# Patient Record
Sex: MALE | Race: WHITE | NOT HISPANIC OR LATINO | Employment: OTHER | URBAN - METROPOLITAN AREA
[De-identification: names, ages, dates, MRNs, and addresses within clinical notes are randomized per-mention and may not be internally consistent; named-entity substitution may affect disease eponyms.]

---

## 2017-03-27 ENCOUNTER — APPOINTMENT (OUTPATIENT)
Dept: LAB | Facility: HOSPITAL | Age: 67
End: 2017-03-27
Attending: UROLOGY
Payer: MEDICARE

## 2017-03-27 ENCOUNTER — TRANSCRIBE ORDERS (OUTPATIENT)
Dept: ADMINISTRATIVE | Facility: HOSPITAL | Age: 67
End: 2017-03-27

## 2017-03-27 DIAGNOSIS — N40.1 BENIGN LOCALIZED HYPERPLASIA OF PROSTATE WITH URINARY RETENTION: Primary | ICD-10-CM

## 2017-03-27 DIAGNOSIS — R33.8 BENIGN LOCALIZED HYPERPLASIA OF PROSTATE WITH URINARY RETENTION: ICD-10-CM

## 2017-03-27 DIAGNOSIS — R33.8 BENIGN LOCALIZED HYPERPLASIA OF PROSTATE WITH URINARY RETENTION: Primary | ICD-10-CM

## 2017-03-27 DIAGNOSIS — N40.1 BENIGN LOCALIZED HYPERPLASIA OF PROSTATE WITH URINARY RETENTION: ICD-10-CM

## 2017-03-27 LAB — PSA SERPL-MCNC: 1 NG/ML (ref 0–4)

## 2017-03-27 PROCEDURE — 84153 ASSAY OF PSA TOTAL: CPT

## 2018-01-15 NOTE — RESULT NOTES
Message    Gastric polyps came back as benign fundic gland type  1    Colon polyp came back as tubular adenoma  1    Repeat colonoscopy in 3   years      LMOM for pt to call the office for results/reminder set  thanks CF1       1 Amended By: [place] Eun; Mar 08 2016 9:46 AM EST    Verified Results  (1) TISSUE EXAM [id number] 09:39AM [facility]     Test Name Result Flag Reference   LAB AP CASE REPORT (Report)     Surgical Pathology Report             Case: [id number]                   Authorizing Provider: Gabriele Granados MD     Collected:      02/29/2016 0939        Ordering Location:   [facility]    Received:      02/29/2016 [place] [address] [facility]                               Pathologist:      Kenyetta Gutierrez MD                              Specimens:  A) - Stomach, Bx Gastric body r/o hpylori                               B) - Polyp, Stomach/Small Intestine, Polypectomy gastric polyps                    C) - Polyp, Colorectal, Polypectomy descending   LAB AP FINAL DIAGNOSIS (Report)     A  Stomach, body, biopsy:        - Oxyntic mucosa with chronic inactive gastritis  - No Helicobacter pylori-like organisms are identified on the   immunohistochemical stain, performed with an appropriate positive control         - No intestinal metaplasia, dysplasia or malignancy is   identified  B  Stomach, polypectomy:        - Multiple fundic gland polyps        - No intestinal metaplasia, dysplasia or malignancy is   identified  C  Colon, descending, polypectomy:        - Tubular adenoma  - No high-grade dysplasia or malignancy is identified  LAB AP SURGICAL ADDITIONAL INFORMATION (Report)     These tests were developed and their performance characteristics   determined by [address] [place] [address] [facility] or [place]  They may not be cleared or approved by the [place]  Food and   Drug Administration   The FDA has determined that such clearance or approval is not necessary  These tests are used for clinical purposes  They should not be regarded as investigational or for research  This   laboratory has been approved by [place] [id number], designated as a high-complexity   laboratory and is qualified to perform these tests  LAB AP GROSS DESCRIPTION (Report)     A  The specimen is received in formalin, labeled with the patient's name   and hospital number, and is designated gastric body biopsy  The   specimen consists of 2 tan-pink soft tissue fragments measuring 0 2   centimeters and 0 3 centimeters in greatest dimension  Entirely submitted  One cassette  B  The specimen is received in formalin, labeled with the patient's name   and hospital number, and is designated gastric polyps  The specimen   consists of one tan-pink polypoid soft tissue fragment measuring zero   point 8 by 0 8 by 0 5 centimeters  The apparent margin of resection is   inked blue the polyp is bisected revealing tan-pink dense cut surfaces  Also submitted in container are multiple tan-pink soft tissue fragments   measuring in aggregate 1 2 by 1 1 by 0 3 centimeters  Entirely submitted  Two cassettes, with largest polyp in cassette A2  C  The specimen is received in formalin, labeled with the patient's name   and hospital number, and is designated descending colon polyp  The   specimen consists of one tan-pink soft tissue fragment measuring 0 3   centimeters in greatest dimension  Entirely submitted  One cassette  Note: The estimated total formalin fixation time based upon information   provided by the submitting clinician and the standard processing schedule   is 28 0 hours    MAS

## 2018-04-09 ENCOUNTER — APPOINTMENT (OUTPATIENT)
Dept: LAB | Facility: CLINIC | Age: 68
End: 2018-04-09
Payer: MEDICARE

## 2018-04-09 ENCOUNTER — TRANSCRIBE ORDERS (OUTPATIENT)
Dept: LAB | Facility: CLINIC | Age: 68
End: 2018-04-09

## 2018-04-09 DIAGNOSIS — N40.3 NODULAR PROSTATE WITH URINARY RETENTION: ICD-10-CM

## 2018-04-09 DIAGNOSIS — N13.8 NODULAR PROSTATE WITH URINARY OBSTRUCTION: Primary | ICD-10-CM

## 2018-04-09 DIAGNOSIS — R33.8 NODULAR PROSTATE WITH URINARY RETENTION: ICD-10-CM

## 2018-04-09 DIAGNOSIS — N40.3 NODULAR PROSTATE WITH URINARY OBSTRUCTION: Primary | ICD-10-CM

## 2018-04-09 PROCEDURE — G0103 PSA SCREENING: HCPCS

## 2018-04-10 LAB — PSA SERPL-MCNC: 1.2 NG/ML (ref 0–4)

## 2018-09-06 PROCEDURE — 88305 TISSUE EXAM BY PATHOLOGIST: CPT | Performed by: PATHOLOGY

## 2018-09-07 ENCOUNTER — TRANSCRIBE ORDERS (OUTPATIENT)
Dept: LAB | Facility: CLINIC | Age: 68
End: 2018-09-07

## 2018-09-07 ENCOUNTER — LAB REQUISITION (OUTPATIENT)
Dept: LAB | Facility: HOSPITAL | Age: 68
End: 2018-09-07
Payer: MEDICARE

## 2018-09-07 ENCOUNTER — APPOINTMENT (OUTPATIENT)
Dept: LAB | Facility: CLINIC | Age: 68
End: 2018-09-07
Payer: MEDICARE

## 2018-09-07 DIAGNOSIS — F41.9 ANXIETY HYPERVENTILATION: ICD-10-CM

## 2018-09-07 DIAGNOSIS — D48.9 NEOPLASM OF UNCERTAIN BEHAVIOR: ICD-10-CM

## 2018-09-07 DIAGNOSIS — F45.8 ANXIETY HYPERVENTILATION: ICD-10-CM

## 2018-09-07 DIAGNOSIS — I10 ESSENTIAL HYPERTENSION, MALIGNANT: Primary | ICD-10-CM

## 2018-09-07 LAB
25(OH)D3 SERPL-MCNC: 30.9 NG/ML (ref 30–100)
ALBUMIN SERPL BCP-MCNC: 3.9 G/DL (ref 3.5–5)
ALP SERPL-CCNC: 58 U/L (ref 46–116)
ALT SERPL W P-5'-P-CCNC: 31 U/L (ref 12–78)
ANION GAP SERPL CALCULATED.3IONS-SCNC: 6 MMOL/L (ref 4–13)
AST SERPL W P-5'-P-CCNC: 28 U/L (ref 5–45)
BASOPHILS # BLD AUTO: 0.04 THOUSANDS/ΜL (ref 0–0.1)
BASOPHILS NFR BLD AUTO: 1 % (ref 0–1)
BILIRUB SERPL-MCNC: 0.98 MG/DL (ref 0.2–1)
BUN SERPL-MCNC: 19 MG/DL (ref 5–25)
CALCIUM SERPL-MCNC: 8.8 MG/DL (ref 8.3–10.1)
CHLORIDE SERPL-SCNC: 104 MMOL/L (ref 100–108)
CHOLEST SERPL-MCNC: 143 MG/DL (ref 50–200)
CO2 SERPL-SCNC: 30 MMOL/L (ref 21–32)
CREAT SERPL-MCNC: 1.11 MG/DL (ref 0.6–1.3)
EOSINOPHIL # BLD AUTO: 0.09 THOUSAND/ΜL (ref 0–0.61)
EOSINOPHIL NFR BLD AUTO: 2 % (ref 0–6)
ERYTHROCYTE [DISTWIDTH] IN BLOOD BY AUTOMATED COUNT: 12.3 % (ref 11.6–15.1)
ERYTHROCYTE [SEDIMENTATION RATE] IN BLOOD: 2 MM/HOUR (ref 0–10)
GFR SERPL CREATININE-BSD FRML MDRD: 67 ML/MIN/1.73SQ M
GLUCOSE P FAST SERPL-MCNC: 88 MG/DL (ref 65–99)
HCT VFR BLD AUTO: 46.6 % (ref 36.5–49.3)
HDLC SERPL-MCNC: 69 MG/DL (ref 40–60)
HGB BLD-MCNC: 15.5 G/DL (ref 12–17)
IMM GRANULOCYTES # BLD AUTO: 0.02 THOUSAND/UL (ref 0–0.2)
IMM GRANULOCYTES NFR BLD AUTO: 0 % (ref 0–2)
LDLC SERPL CALC-MCNC: 63 MG/DL (ref 0–100)
LYMPHOCYTES # BLD AUTO: 1.04 THOUSANDS/ΜL (ref 0.6–4.47)
LYMPHOCYTES NFR BLD AUTO: 21 % (ref 14–44)
MAGNESIUM SERPL-MCNC: 2 MG/DL (ref 1.6–2.6)
MCH RBC QN AUTO: 29 PG (ref 26.8–34.3)
MCHC RBC AUTO-ENTMCNC: 33.3 G/DL (ref 31.4–37.4)
MCV RBC AUTO: 87 FL (ref 82–98)
MONOCYTES # BLD AUTO: 0.48 THOUSAND/ΜL (ref 0.17–1.22)
MONOCYTES NFR BLD AUTO: 10 % (ref 4–12)
NEUTROPHILS # BLD AUTO: 3.28 THOUSANDS/ΜL (ref 1.85–7.62)
NEUTS SEG NFR BLD AUTO: 66 % (ref 43–75)
NONHDLC SERPL-MCNC: 74 MG/DL
NRBC BLD AUTO-RTO: 0 /100 WBCS
PLATELET # BLD AUTO: 144 THOUSANDS/UL (ref 149–390)
PMV BLD AUTO: 10.3 FL (ref 8.9–12.7)
POTASSIUM SERPL-SCNC: 4.2 MMOL/L (ref 3.5–5.3)
PROT SERPL-MCNC: 6.9 G/DL (ref 6.4–8.2)
RBC # BLD AUTO: 5.35 MILLION/UL (ref 3.88–5.62)
SODIUM SERPL-SCNC: 140 MMOL/L (ref 136–145)
TRIGL SERPL-MCNC: 55 MG/DL
VIT B12 SERPL-MCNC: 865 PG/ML (ref 100–900)
WBC # BLD AUTO: 4.95 THOUSAND/UL (ref 4.31–10.16)

## 2018-09-07 PROCEDURE — 86618 LYME DISEASE ANTIBODY: CPT

## 2018-09-07 PROCEDURE — 86617 LYME DISEASE ANTIBODY: CPT

## 2018-09-07 PROCEDURE — 85652 RBC SED RATE AUTOMATED: CPT

## 2018-09-07 PROCEDURE — 82747 ASSAY OF FOLIC ACID RBC: CPT

## 2018-09-07 PROCEDURE — 85025 COMPLETE CBC W/AUTO DIFF WBC: CPT

## 2018-09-07 PROCEDURE — 82607 VITAMIN B-12: CPT

## 2018-09-07 PROCEDURE — 80061 LIPID PANEL: CPT

## 2018-09-07 PROCEDURE — 80053 COMPREHEN METABOLIC PANEL: CPT

## 2018-09-07 PROCEDURE — 83735 ASSAY OF MAGNESIUM: CPT

## 2018-09-07 PROCEDURE — 82306 VITAMIN D 25 HYDROXY: CPT

## 2018-09-07 PROCEDURE — 36415 COLL VENOUS BLD VENIPUNCTURE: CPT

## 2018-09-09 LAB
B BURGDOR IGG SER IA-ACNC: 1.75
B BURGDOR IGM SER IA-ACNC: 0.47
FOLATE BLD-MCNC: 595.1 NG/ML
FOLATE RBC-MCNC: NORMAL NG/ML
HCT VFR BLD AUTO: NORMAL %
MORPHOLOGY BLD-IMP: NORMAL

## 2018-09-12 LAB

## 2019-01-16 ENCOUNTER — TRANSCRIBE ORDERS (OUTPATIENT)
Dept: LAB | Facility: CLINIC | Age: 69
End: 2019-01-16

## 2019-01-16 ENCOUNTER — APPOINTMENT (OUTPATIENT)
Dept: LAB | Facility: CLINIC | Age: 69
End: 2019-01-16
Payer: MEDICARE

## 2019-01-16 DIAGNOSIS — I10 ESSENTIAL HYPERTENSION, MALIGNANT: Primary | ICD-10-CM

## 2019-01-16 DIAGNOSIS — E78.5 HYPERLIPIDEMIA, UNSPECIFIED HYPERLIPIDEMIA TYPE: ICD-10-CM

## 2019-01-16 LAB
ANION GAP SERPL CALCULATED.3IONS-SCNC: 4 MMOL/L (ref 4–13)
BASOPHILS # BLD AUTO: 0.04 THOUSANDS/ΜL (ref 0–0.1)
BASOPHILS NFR BLD AUTO: 1 % (ref 0–1)
BUN SERPL-MCNC: 29 MG/DL (ref 5–25)
CALCIUM SERPL-MCNC: 8.6 MG/DL (ref 8.3–10.1)
CHLORIDE SERPL-SCNC: 109 MMOL/L (ref 100–108)
CHOLEST SERPL-MCNC: 134 MG/DL (ref 50–200)
CO2 SERPL-SCNC: 27 MMOL/L (ref 21–32)
CREAT SERPL-MCNC: 1.34 MG/DL (ref 0.6–1.3)
EOSINOPHIL # BLD AUTO: 0.1 THOUSAND/ΜL (ref 0–0.61)
EOSINOPHIL NFR BLD AUTO: 2 % (ref 0–6)
ERYTHROCYTE [DISTWIDTH] IN BLOOD BY AUTOMATED COUNT: 12.3 % (ref 11.6–15.1)
GFR SERPL CREATININE-BSD FRML MDRD: 53 ML/MIN/1.73SQ M
GLUCOSE P FAST SERPL-MCNC: 91 MG/DL (ref 65–99)
HCT VFR BLD AUTO: 44.1 % (ref 36.5–49.3)
HDLC SERPL-MCNC: 59 MG/DL (ref 40–60)
HGB BLD-MCNC: 14.6 G/DL (ref 12–17)
IMM GRANULOCYTES # BLD AUTO: 0.01 THOUSAND/UL (ref 0–0.2)
IMM GRANULOCYTES NFR BLD AUTO: 0 % (ref 0–2)
LDLC SERPL CALC-MCNC: 64 MG/DL (ref 0–100)
LYMPHOCYTES # BLD AUTO: 1.36 THOUSANDS/ΜL (ref 0.6–4.47)
LYMPHOCYTES NFR BLD AUTO: 27 % (ref 14–44)
MAGNESIUM SERPL-MCNC: 2.1 MG/DL (ref 1.6–2.6)
MCH RBC QN AUTO: 28.5 PG (ref 26.8–34.3)
MCHC RBC AUTO-ENTMCNC: 33.1 G/DL (ref 31.4–37.4)
MCV RBC AUTO: 86 FL (ref 82–98)
MONOCYTES # BLD AUTO: 0.56 THOUSAND/ΜL (ref 0.17–1.22)
MONOCYTES NFR BLD AUTO: 11 % (ref 4–12)
NEUTROPHILS # BLD AUTO: 3.03 THOUSANDS/ΜL (ref 1.85–7.62)
NEUTS SEG NFR BLD AUTO: 59 % (ref 43–75)
NONHDLC SERPL-MCNC: 75 MG/DL
NRBC BLD AUTO-RTO: 0 /100 WBCS
PLATELET # BLD AUTO: 154 THOUSANDS/UL (ref 149–390)
PMV BLD AUTO: 10 FL (ref 8.9–12.7)
POTASSIUM SERPL-SCNC: 4 MMOL/L (ref 3.5–5.3)
RBC # BLD AUTO: 5.12 MILLION/UL (ref 3.88–5.62)
SODIUM SERPL-SCNC: 140 MMOL/L (ref 136–145)
TRIGL SERPL-MCNC: 54 MG/DL
WBC # BLD AUTO: 5.1 THOUSAND/UL (ref 4.31–10.16)

## 2019-01-16 PROCEDURE — 83735 ASSAY OF MAGNESIUM: CPT

## 2019-01-16 PROCEDURE — 85025 COMPLETE CBC W/AUTO DIFF WBC: CPT

## 2019-01-16 PROCEDURE — 80048 BASIC METABOLIC PNL TOTAL CA: CPT

## 2019-01-16 PROCEDURE — 80061 LIPID PANEL: CPT

## 2019-01-16 PROCEDURE — 36415 COLL VENOUS BLD VENIPUNCTURE: CPT

## 2019-08-06 ENCOUNTER — APPOINTMENT (OUTPATIENT)
Dept: LAB | Facility: CLINIC | Age: 69
End: 2019-08-06
Payer: MEDICARE

## 2019-08-06 ENCOUNTER — TRANSCRIBE ORDERS (OUTPATIENT)
Dept: LAB | Facility: CLINIC | Age: 69
End: 2019-08-06

## 2019-08-06 DIAGNOSIS — R00.2 PALPITATIONS: ICD-10-CM

## 2019-08-06 DIAGNOSIS — E78.9 DISORDER OF LIPOPROTEIN AND LIPID METABOLISM: ICD-10-CM

## 2019-08-06 DIAGNOSIS — I10 ESSENTIAL HYPERTENSION, MALIGNANT: ICD-10-CM

## 2019-08-06 DIAGNOSIS — R60.9 EDEMA, UNSPECIFIED TYPE: Primary | ICD-10-CM

## 2019-08-06 LAB
ALT SERPL W P-5'-P-CCNC: 34 U/L (ref 12–78)
ANION GAP SERPL CALCULATED.3IONS-SCNC: 4 MMOL/L (ref 4–13)
AST SERPL W P-5'-P-CCNC: 27 U/L (ref 5–45)
BUN SERPL-MCNC: 33 MG/DL (ref 5–25)
CALCIUM SERPL-MCNC: 9.3 MG/DL (ref 8.3–10.1)
CHLORIDE SERPL-SCNC: 111 MMOL/L (ref 100–108)
CHOLEST SERPL-MCNC: 148 MG/DL (ref 50–200)
CO2 SERPL-SCNC: 31 MMOL/L (ref 21–32)
CREAT SERPL-MCNC: 1.19 MG/DL (ref 0.6–1.3)
ERYTHROCYTE [DISTWIDTH] IN BLOOD BY AUTOMATED COUNT: 12.6 % (ref 11.6–15.1)
GFR SERPL CREATININE-BSD FRML MDRD: 61 ML/MIN/1.73SQ M
GLUCOSE P FAST SERPL-MCNC: 102 MG/DL (ref 65–99)
HCT VFR BLD AUTO: 43.4 % (ref 36.5–49.3)
HDLC SERPL-MCNC: 63 MG/DL (ref 40–60)
HGB BLD-MCNC: 13.9 G/DL (ref 12–17)
LDLC SERPL CALC-MCNC: 78 MG/DL (ref 0–100)
LDLC SERPL DIRECT ASSAY-MCNC: 76 MG/DL (ref 0–100)
MCH RBC QN AUTO: 28.4 PG (ref 26.8–34.3)
MCHC RBC AUTO-ENTMCNC: 32 G/DL (ref 31.4–37.4)
MCV RBC AUTO: 89 FL (ref 82–98)
NONHDLC SERPL-MCNC: 85 MG/DL
PLATELET # BLD AUTO: 153 THOUSANDS/UL (ref 149–390)
PMV BLD AUTO: 10.5 FL (ref 8.9–12.7)
POTASSIUM SERPL-SCNC: 4.6 MMOL/L (ref 3.5–5.3)
RBC # BLD AUTO: 4.89 MILLION/UL (ref 3.88–5.62)
SODIUM SERPL-SCNC: 146 MMOL/L (ref 136–145)
TRIGL SERPL-MCNC: 36 MG/DL
TSH SERPL DL<=0.05 MIU/L-ACNC: 1.9 UIU/ML (ref 0.36–3.74)
WBC # BLD AUTO: 4.29 THOUSAND/UL (ref 4.31–10.16)

## 2019-08-06 PROCEDURE — 84450 TRANSFERASE (AST) (SGOT): CPT

## 2019-08-06 PROCEDURE — 80048 BASIC METABOLIC PNL TOTAL CA: CPT

## 2019-08-06 PROCEDURE — 84443 ASSAY THYROID STIM HORMONE: CPT

## 2019-08-06 PROCEDURE — 80061 LIPID PANEL: CPT

## 2019-08-06 PROCEDURE — 83721 ASSAY OF BLOOD LIPOPROTEIN: CPT

## 2019-08-06 PROCEDURE — 84460 ALANINE AMINO (ALT) (SGPT): CPT

## 2019-08-06 PROCEDURE — 36415 COLL VENOUS BLD VENIPUNCTURE: CPT

## 2019-08-06 PROCEDURE — 85027 COMPLETE CBC AUTOMATED: CPT

## 2019-09-25 ENCOUNTER — TRANSCRIBE ORDERS (OUTPATIENT)
Dept: LAB | Facility: CLINIC | Age: 69
End: 2019-09-25

## 2019-11-18 ENCOUNTER — HOSPITAL ENCOUNTER (OUTPATIENT)
Dept: RADIOLOGY | Facility: HOSPITAL | Age: 69
Discharge: HOME | End: 2019-11-18
Attending: UROLOGY
Payer: MEDICARE

## 2019-11-18 ENCOUNTER — TRANSCRIBE ORDERS (OUTPATIENT)
Dept: ADMINISTRATIVE | Facility: HOSPITAL | Age: 69
End: 2019-11-18

## 2019-11-18 DIAGNOSIS — Z87.442 PERSONAL HISTORY OF URINARY CALCULI: Primary | ICD-10-CM

## 2019-11-18 PROCEDURE — 74018 RADEX ABDOMEN 1 VIEW: CPT

## 2021-02-26 ENCOUNTER — IMMUNIZATIONS (OUTPATIENT)
Dept: FAMILY MEDICINE CLINIC | Facility: HOSPITAL | Age: 71
End: 2021-02-26

## 2021-02-26 DIAGNOSIS — Z23 ENCOUNTER FOR IMMUNIZATION: Primary | ICD-10-CM

## 2021-02-26 PROCEDURE — 91301 SARS-COV-2 / COVID-19 MRNA VACCINE (MODERNA) 100 MCG: CPT

## 2021-02-26 PROCEDURE — 0011A SARS-COV-2 / COVID-19 MRNA VACCINE (MODERNA) 100 MCG: CPT

## 2021-03-26 ENCOUNTER — IMMUNIZATIONS (OUTPATIENT)
Dept: FAMILY MEDICINE CLINIC | Facility: HOSPITAL | Age: 71
End: 2021-03-26

## 2021-03-26 DIAGNOSIS — Z23 ENCOUNTER FOR IMMUNIZATION: Primary | ICD-10-CM

## 2021-03-26 PROCEDURE — 91301 SARS-COV-2 / COVID-19 MRNA VACCINE (MODERNA) 100 MCG: CPT

## 2021-03-26 PROCEDURE — 0012A SARS-COV-2 / COVID-19 MRNA VACCINE (MODERNA) 100 MCG: CPT

## 2023-02-09 ENCOUNTER — OFFICE VISIT (OUTPATIENT)
Dept: PODIATRY | Facility: CLINIC | Age: 73
End: 2023-02-09

## 2023-02-09 VITALS — BODY MASS INDEX: 29.12 KG/M2 | WEIGHT: 215 LBS | RESPIRATION RATE: 17 BRPM | HEIGHT: 72 IN

## 2023-02-09 DIAGNOSIS — B35.1 ONYCHOMYCOSIS: ICD-10-CM

## 2023-02-09 DIAGNOSIS — L03.031 PARONYCHIA OF TOENAIL OF RIGHT FOOT: ICD-10-CM

## 2023-02-09 DIAGNOSIS — M79.671 PAIN IN BOTH FEET: Primary | ICD-10-CM

## 2023-02-09 DIAGNOSIS — L60.0 INGROWN TOENAIL: ICD-10-CM

## 2023-02-09 DIAGNOSIS — M79.672 PAIN IN BOTH FEET: Primary | ICD-10-CM

## 2023-02-09 NOTE — PROGRESS NOTES
Assessment/Plan: Pain upon ambulation  Mild peripheral artery disease  Paronychia right hallux  Ingrown toenail  Plan  Foot exam performed  Patient educated on condition  Patient had all nails debrided today without pain or complication  He might need right nail hallux matrixectomy  Patient advised on aftercare  Diagnoses and all orders for this visit:    Pain in both feet    Onychomycosis    Ingrown toenail    Paronychia of toenail of right foot          Subjective: Patient has pain  He has pain in his toes ambulation  Mostly the right big toe  No Known Allergies      Current Outpatient Medications:   •  aspirin 81 MG tablet, Take 81 mg by mouth daily  , Disp: , Rfl:   •  atorvastatin (LIPITOR) 10 mg tablet, Take 10 mg by mouth daily  , Disp: , Rfl:   •  folic acid (FOLVITE) 1 mg tablet, Take 1 mg by mouth daily  , Disp: , Rfl:   •  pantoprazole (PROTONIX) 40 mg tablet, Take 40 mg by mouth daily  , Disp: , Rfl:     There is no problem list on file for this patient  Patient ID: Farzaneh Espinoza is a 74 y o  male  HPI    The following portions of the patient's history were reviewed and updated as appropriate:     family history is not on file  reports that he has never smoked  He does not have any smokeless tobacco history on file  He reports that he does not use drugs  No history on file for alcohol use  Vitals:    02/09/23 1310   Resp: 17       Review of Systems      Objective:  Patient's shoes and socks removed     Foot Exam    General  General Appearance: appears stated age and healthy   Orientation: alert and oriented to person, place, and time   Affect: appropriate   Gait: antalgic       Right Foot/Ankle     Inspection and Palpation  Tenderness: metatarsals   Swelling: dorsum   Arch: pes planus  Skin Exam: dry skin;     Neurovascular  Dorsalis pedis: 1+  Posterior tibial: 3+      Left Foot/Ankle      Inspection and Palpation  Tenderness: metatarsals   Swelling: dorsum Arch: pes planus  Skin Exam: dry skin;     Neurovascular  Dorsalis pedis: 1+  Posterior tibial: 3+        Physical Exam  Vitals and nursing note reviewed  Constitutional:       Appearance: Normal appearance  Cardiovascular:      Rate and Rhythm: Normal rate and regular rhythm  Pulses:           Dorsalis pedis pulses are 1+ on the right side and 1+ on the left side  Posterior tibial pulses are 3+ on the right side and 3+ on the left side  Feet:      Right foot:      Skin integrity: Dry skin present  Left foot:      Skin integrity: Dry skin present  Skin:     Capillary Refill: Capillary refill takes less than 2 seconds  Comments: Nails are dystrophic  Right hallux demonstrates incurvated nail  He is ingrown in the fibular aspect  Positive paronychia    Negative pus   Neurological:      Mental Status: He is alert  Psychiatric:         Mood and Affect: Mood normal          Behavior: Behavior normal          Thought Content:  Thought content normal          Judgment: Judgment normal

## 2023-04-06 ENCOUNTER — OFFICE VISIT (OUTPATIENT)
Dept: PODIATRY | Facility: CLINIC | Age: 73
End: 2023-04-06

## 2023-04-06 VITALS — HEIGHT: 72 IN | RESPIRATION RATE: 17 BRPM | WEIGHT: 215 LBS | BODY MASS INDEX: 29.12 KG/M2

## 2023-04-06 DIAGNOSIS — M79.671 PAIN IN BOTH FEET: ICD-10-CM

## 2023-04-06 DIAGNOSIS — M21.961 ACQUIRED DEFORMITY OF BOTH FEET: Primary | ICD-10-CM

## 2023-04-06 DIAGNOSIS — B35.1 ONYCHOMYCOSIS: ICD-10-CM

## 2023-04-06 DIAGNOSIS — M79.672 PAIN IN BOTH FEET: ICD-10-CM

## 2023-04-06 DIAGNOSIS — L03.031 PARONYCHIA OF TOENAIL OF RIGHT FOOT: ICD-10-CM

## 2023-04-06 DIAGNOSIS — L60.0 INGROWN TOENAIL: ICD-10-CM

## 2023-04-06 DIAGNOSIS — M21.962 ACQUIRED DEFORMITY OF BOTH FEET: Primary | ICD-10-CM

## 2023-04-06 NOTE — PROGRESS NOTES
Assessment/Plan: Pain upon ambulation  Mild peripheral artery disease  Paronychia right hallux  Ingrown toenail  Require deformity foot bilateral   Functional hallux limitus      Plan  Foot exam performed  Patient educated on condition  Patient had all nails debrided today without pain or complication  He might need right nail hallux matrixectomy  Patient advised on aftercare  Patient will use topical antifungal as directed  He is advised on proper shoe sizing             Diagnoses and all orders for this visit:     Pain in both feet     Onychomycosis     Ingrown toenail     Paronychia of toenail of right foot    Acquired deformity foot bilateral                Subjective: Patient has pain  He has pain in his toes ambulation  Mostly the right big toe      No Known Allergies        Current Outpatient Medications:   •  aspirin 81 MG tablet, Take 81 mg by mouth daily  , Disp: , Rfl:   •  atorvastatin (LIPITOR) 10 mg tablet, Take 10 mg by mouth daily  , Disp: , Rfl:   •  folic acid (FOLVITE) 1 mg tablet, Take 1 mg by mouth daily  , Disp: , Rfl:   •  pantoprazole (PROTONIX) 40 mg tablet, Take 40 mg by mouth daily  , Disp: , Rfl:      There is no problem list on file for this patient             Patient ID: Yvonne Andrew is a 74 y o  male      HPI     The following portions of the patient's history were reviewed and updated as appropriate:      family history is not on file        reports that he has never smoked  He does not have any smokeless tobacco history on file  He reports that he does not use drugs  No history on file for alcohol use       Objective:  Patient's shoes and socks removed     Foot Exam     General  General Appearance: appears stated age and healthy   Orientation: alert and oriented to person, place, and time   Affect: appropriate   Gait: antalgic         Right Foot/Ankle      Inspection and Palpation  Tenderness: metatarsals   Swelling: dorsum   Arch: pes planus  Skin Exam: dry skin;    Neurovascular  Dorsalis pedis: 1+  Posterior tibial: 3+        Left Foot/Ankle       Inspection and Palpation  Tenderness: metatarsals   Swelling: dorsum   Arch: pes planus  Skin Exam: dry skin;      Neurovascular  Dorsalis pedis: 1+  Posterior tibial: 3+           Physical Exam  Vitals and nursing note reviewed  Constitutional:       Appearance: Normal appearance  Cardiovascular:      Rate and Rhythm: Normal rate and regular rhythm  Pulses:           Dorsalis pedis pulses are 1+ on the right side and 1+ on the left side  Posterior tibial pulses are 3+ on the right side and 3+ on the left side  Feet:      Right foot:      Skin integrity: Dry skin present  Left foot:      Skin integrity: Dry skin present  Skin:     Capillary Refill: Capillary refill takes less than 2 seconds  Comments: Nails are dystrophic  Right hallux demonstrates incurvated nail  He is ingrown in the fibular aspect  Positive paronychia    Negative pus   Neurological:      Mental Status: He is alert  Psychiatric:         Mood and Affect: Mood normal          Behavior: Behavior normal          Thought Content:  Thought content normal          Judgment: Judgment normal

## 2023-06-15 ENCOUNTER — OFFICE VISIT (OUTPATIENT)
Dept: PODIATRY | Facility: CLINIC | Age: 73
End: 2023-06-15
Payer: MEDICARE

## 2023-06-15 VITALS — RESPIRATION RATE: 17 BRPM | HEIGHT: 72 IN | WEIGHT: 215 LBS | BODY MASS INDEX: 29.12 KG/M2

## 2023-06-15 DIAGNOSIS — B35.1 ONYCHOMYCOSIS: Primary | ICD-10-CM

## 2023-06-15 DIAGNOSIS — L60.0 INGROWN TOENAIL: ICD-10-CM

## 2023-06-15 DIAGNOSIS — L03.031 PARONYCHIA OF TOENAIL OF RIGHT FOOT: ICD-10-CM

## 2023-06-15 DIAGNOSIS — M21.962 ACQUIRED DEFORMITY OF BOTH FEET: ICD-10-CM

## 2023-06-15 DIAGNOSIS — M79.672 PAIN IN BOTH FEET: ICD-10-CM

## 2023-06-15 DIAGNOSIS — M21.961 ACQUIRED DEFORMITY OF BOTH FEET: ICD-10-CM

## 2023-06-15 DIAGNOSIS — M79.671 PAIN IN BOTH FEET: ICD-10-CM

## 2023-06-15 PROCEDURE — 99213 OFFICE O/P EST LOW 20 MIN: CPT | Performed by: PODIATRIST

## 2023-06-15 RX ORDER — TERBINAFINE HYDROCHLORIDE 250 MG/1
TABLET ORAL
Qty: 15 TABLET | Refills: 0 | Status: SHIPPED | OUTPATIENT
Start: 2023-06-15 | End: 2023-07-15

## 2023-06-15 NOTE — PROGRESS NOTES
Assessment/Plan: Pain upon ambulation   Mild peripheral artery disease   Paronychia right hallux   Ingrown toenail     Require deformity foot bilateral   Functional hallux limitus      Plan   Foot exam performed   Patient educated on condition   Patient had all nails debrided today without pain or complication   He might need right nail hallux matrixectomy   Patient advised on aftercare  Patient will use topical antifungal as directed  He is advised on proper shoe sizing  In addition we will do an empiric course of Lamisil  Assessment/Plan:         There are no diagnoses linked to this encounter  Subjective:             No Known Allergies      Current Outpatient Medications:   •  aspirin 81 MG tablet, Take 81 mg by mouth daily  , Disp: , Rfl:   •  atorvastatin (LIPITOR) 10 mg tablet, Take 10 mg by mouth daily  , Disp: , Rfl:   •  folic acid (FOLVITE) 1 mg tablet, Take 1 mg by mouth daily  , Disp: , Rfl:   •  pantoprazole (PROTONIX) 40 mg tablet, Take 40 mg by mouth daily  , Disp: , Rfl:     There is no problem list on file for this patient  Patient ID: Sage Meyer is a 74 y o  male  HPI    The following portions of the patient's history were reviewed and updated as appropriate: He  has a past medical history of GERD (gastroesophageal reflux disease), History of TIA (transient ischemic attack), and Hypertension  Review of Systems      Objective:  Patient's shoes and socks removed  Foot ExamPhysical ExamDiabetic Foot Exam      Is anxious to have his chronic ingrown toenail of the right big toe fix    We will schedule nail matrixectomy             Diagnoses and all orders for this visit:     Pain in both feet     Onychomycosis     Ingrown toenail     Paronychia of toenail of right foot     Acquired deformity foot bilateral                  Subjective: Patient has pain   He has pain in his toes ambulation   Mostly the right big toe      No Known Allergies        Current Outpatient Medications:   •  aspirin 81 MG tablet, Take 81 mg by mouth daily  , Disp: , Rfl:   •  atorvastatin (LIPITOR) 10 mg tablet, Take 10 mg by mouth daily  , Disp: , Rfl:   •  folic acid (FOLVITE) 1 mg tablet, Take 1 mg by mouth daily  , Disp: , Rfl:   •  pantoprazole (PROTONIX) 40 mg tablet, Take 40 mg by mouth daily  , Disp: , Rfl:      There is no problem list on file for this patient             Patient ID: Fredy Solis is a 74 y  o  male      HPI     The following portions of the patient's history were reviewed and updated as appropriate:      family history is not on file        reports that he has never smoked  He does not have any smokeless tobacco history on file  He reports that he does not use drugs  No history on file for alcohol use       Objective:  Patient's shoes and socks removed    Foot Exam     General  General Appearance: appears stated age and healthy   Orientation: alert and oriented to person, place, and time   Affect: appropriate   Gait: antalgic         Right Foot/Ankle      Inspection and Palpation  Tenderness: metatarsals   Swelling: dorsum   Arch: pes planus  Skin Exam: dry skin;      Neurovascular  Dorsalis pedis: 1+  Posterior tibial: 3+        Left Foot/Ankle       Inspection and Palpation  Tenderness: metatarsals   Swelling: dorsum   Arch: pes planus  Skin Exam: dry skin;      Neurovascular  Dorsalis pedis: 1+  Posterior tibial: 3+           Physical Exam  Vitals and nursing note reviewed  Constitutional:       Appearance: Normal appearance  Cardiovascular:      Rate and Rhythm: Normal rate and regular rhythm       Pulses:           Dorsalis pedis pulses are 1+ on the right side and 1+ on the left side         Posterior tibial pulses are 3+ on the right side and 3+ on the left side  Feet:      Right foot:      Skin integrity: Dry skin present       Left foot:      Skin integrity: Dry skin present     Skin:     Capillary Refill: Capillary refill takes less than 2 seconds       Comments: Nails are dystrophic   Right hallux demonstrates incurvated nail   He is ingrown in the fibular aspect   Positive paronychia    Negative pus   Neurological:      Mental Status: He is alert  Psychiatric:         Mood and Affect: Mood normal          BehaviorFederico Kilgore         Thought Content:  Thought content normal          Judgment: Judgment normal

## 2023-07-24 ENCOUNTER — PROCEDURE VISIT (OUTPATIENT)
Dept: PODIATRY | Facility: CLINIC | Age: 73
End: 2023-07-24
Payer: MEDICARE

## 2023-07-24 VITALS — WEIGHT: 215 LBS | HEIGHT: 72 IN | RESPIRATION RATE: 17 BRPM | BODY MASS INDEX: 29.12 KG/M2

## 2023-07-24 DIAGNOSIS — L03.031 PARONYCHIA OF TOENAIL OF RIGHT FOOT: ICD-10-CM

## 2023-07-24 DIAGNOSIS — M79.672 PAIN IN BOTH FEET: ICD-10-CM

## 2023-07-24 DIAGNOSIS — L60.0 INGROWN TOENAIL: Primary | ICD-10-CM

## 2023-07-24 DIAGNOSIS — M79.671 PAIN IN BOTH FEET: ICD-10-CM

## 2023-07-24 DIAGNOSIS — B35.1 ONYCHOMYCOSIS: ICD-10-CM

## 2023-07-24 DIAGNOSIS — M21.961 ACQUIRED DEFORMITY OF BOTH FEET: ICD-10-CM

## 2023-07-24 DIAGNOSIS — M21.962 ACQUIRED DEFORMITY OF BOTH FEET: ICD-10-CM

## 2023-07-24 PROCEDURE — 11750 EXCISION NAIL&NAIL MATRIX: CPT | Performed by: PODIATRIST

## 2023-07-24 PROCEDURE — 99212 OFFICE O/P EST SF 10 MIN: CPT | Performed by: PODIATRIST

## 2023-07-24 RX ORDER — TERBINAFINE HYDROCHLORIDE 250 MG/1
TABLET ORAL
Qty: 15 TABLET | Refills: 0 | Status: SHIPPED | OUTPATIENT
Start: 2023-07-24 | End: 2023-08-23

## 2023-07-24 NOTE — PROGRESS NOTES
Nail removal    Date/Time: 7/24/2023 4:30 PM    Performed by: Enid Mckeon DPM  Authorized by: Enid Mckeon DPM    Patient location:  [facility] Protocol:  Consent: Verbal consent obtained. Written consent obtained. Risks and benefits: risks, benefits and alternatives were discussed  Consent given by: patient  Timeout called at: 7/24/2023 4:34 PM.  Patient understanding: patient states understanding of the procedure being performed      Location:     Foot:  R big toe  Pre-procedure details:     Skin preparation:  Betadine    Preparation: Patient was prepped and draped in the usual sterile fashion    Anesthesia (see MAR for exact dosages): Anesthesia method: Hallux nerve block achieved with 6 cc 2% lidocaine plain. Nail Removal:     Nail removed:  Partial    Nail side:  Lateral    Nail bed sutured: no      Removed nail replaced and anchored: no    Trephination:     Subungual hematoma drained: no    Ingrown nail:     Wedge excision of skin: no      Nail matrix removed or ablated:  Partial (Nail matrixectomy performed application of 3, 30 seconds application of phenol)  Post-procedure details:     Dressing:  Non-adhesive packing strip, 4x4 sterile gauze, antibiotic ointment and gauze roll  Comments:      Patient advised on nail matrixectomy. Patient given preoperative and postoperative instruction. Consent form signed. In addition, all nails debrided without pain or complication. Patient remain on oral terbinafine as directed. Return for follow-up.        Foot Exam      Foot Exam     General  General Appearance: appears stated age and healthy   Orientation: alert and oriented to person, place, and time   Affect: appropriate   Gait: antalgic         Right Foot/Ankle      Inspection and Palpation  Tenderness: metatarsals   Swelling: dorsum   Arch: pes planus  Skin Exam: dry skin;      Neurovascular  Dorsalis pedis: 1+  Posterior tibial: 3+        Left Foot/Ankle       Inspection and Palpation  Tenderness: metatarsals   Swelling: dorsum   Arch: pes planus  Skin Exam: dry skin;      Neurovascular  Dorsalis pedis: 1+  Posterior tibial: 3+           Physical Exam  Vitals and nursing note reviewed. Constitutional:       Appearance: Normal appearance. Cardiovascular:      Rate and Rhythm: Normal rate and regular rhythm.      Pulses:           Dorsalis pedis pulses are 1+ on the right side and 1+ on the left side.        Posterior tibial pulses are 3+ on the right side and 3+ on the left side. Feet:      Right foot:      Skin integrity: Dry skin present.      Left foot:      Skin integrity: Dry skin present. Skin:     Capillary Refill: Capillary refill takes less than 2 seconds.      Comments: Nails are dystrophic.  Right hallux demonstrates incurvated nail.  He is ingrown in the fibular aspect.  Positive paronychia. Nails 234 of the left foot demonstrate distal mycosis. Negative pus   Neurological:      Mental Status: He is alert. Psychiatric:         Mood and Affect: Mood normal.         BehaviorTerre Filippo         Thought Content:  Thought content normal.         Judgment: Judgment normal.

## 2023-08-29 ENCOUNTER — OFFICE VISIT (OUTPATIENT)
Dept: PODIATRY | Facility: CLINIC | Age: 73
End: 2023-08-29
Payer: MEDICARE

## 2023-08-29 VITALS — RESPIRATION RATE: 17 BRPM | BODY MASS INDEX: 29.12 KG/M2 | HEIGHT: 72 IN | WEIGHT: 215 LBS

## 2023-08-29 DIAGNOSIS — B35.1 ONYCHOMYCOSIS: ICD-10-CM

## 2023-08-29 DIAGNOSIS — S91.109A OPEN WOUND OF TOE, INITIAL ENCOUNTER: ICD-10-CM

## 2023-08-29 DIAGNOSIS — M79.671 PAIN IN BOTH FEET: Primary | ICD-10-CM

## 2023-08-29 DIAGNOSIS — M21.962 ACQUIRED DEFORMITY OF BOTH FEET: ICD-10-CM

## 2023-08-29 DIAGNOSIS — M79.672 PAIN IN BOTH FEET: Primary | ICD-10-CM

## 2023-08-29 DIAGNOSIS — M21.961 ACQUIRED DEFORMITY OF BOTH FEET: ICD-10-CM

## 2023-08-29 PROCEDURE — 99213 OFFICE O/P EST LOW 20 MIN: CPT | Performed by: PODIATRIST

## 2023-08-29 RX ORDER — TERBINAFINE HYDROCHLORIDE 250 MG/1
TABLET ORAL
Qty: 15 TABLET | Refills: 0 | Status: SHIPPED | OUTPATIENT
Start: 2023-08-29 | End: 2023-09-28

## 2023-11-07 ENCOUNTER — OFFICE VISIT (OUTPATIENT)
Age: 73
End: 2023-11-07
Payer: MEDICARE

## 2023-11-07 VITALS
WEIGHT: 215 LBS | HEART RATE: 57 BPM | SYSTOLIC BLOOD PRESSURE: 107 MMHG | HEIGHT: 72 IN | RESPIRATION RATE: 17 BRPM | BODY MASS INDEX: 29.12 KG/M2 | DIASTOLIC BLOOD PRESSURE: 70 MMHG

## 2023-11-07 DIAGNOSIS — M79.672 PAIN IN BOTH FEET: Primary | ICD-10-CM

## 2023-11-07 DIAGNOSIS — M79.671 PAIN IN BOTH FEET: Primary | ICD-10-CM

## 2023-11-07 DIAGNOSIS — L60.0 INGROWN TOENAIL: ICD-10-CM

## 2023-11-07 DIAGNOSIS — M21.962 ACQUIRED DEFORMITY OF BOTH FEET: ICD-10-CM

## 2023-11-07 DIAGNOSIS — B35.1 ONYCHOMYCOSIS: ICD-10-CM

## 2023-11-07 DIAGNOSIS — M21.961 ACQUIRED DEFORMITY OF BOTH FEET: ICD-10-CM

## 2023-11-07 PROCEDURE — 99213 OFFICE O/P EST LOW 20 MIN: CPT | Performed by: PODIATRIST

## 2023-11-07 NOTE — PROGRESS NOTES
Assessment/Plan: Open wound of toe. Resolved paronychia. Acquired deformity foot bilateral.  Mycosis of nail, resolving. Pain upon ambulation. Plan. Chart reviewed. Lab work reviewed. Patient advised on condition. Patient may have had reaction to Lamisil. He had elevation of inflammatory markers. At this time we recommend topical antifungal.  All nails debrided without pain or complication. Patient will watch for signs of infection. Aftercare instruction given. Return for follow-up            Diagnoses and all orders for this visit:     Pain in both feet     Acquired deformity of both feet     Onychomycosis              Subjective: Patient is doing well. Took medication as prescribed. He believes it elevated his inflammatory markers     No Known Allergies        Current Outpatient Medications:     terbinafine (LamISIL) 250 mg tablet, 1 tab p.o. every other day., Disp: 15 tablet, Rfl: 0    aspirin 81 MG tablet, Take 81 mg by mouth daily. , Disp: , Rfl:     atorvastatin (LIPITOR) 10 mg tablet, Take 10 mg by mouth daily. , Disp: , Rfl:     folic acid (FOLVITE) 1 mg tablet, Take 1 mg by mouth daily. , Disp: , Rfl:     pantoprazole (PROTONIX) 40 mg tablet, Take 40 mg by mouth daily. , Disp: , Rfl:      There is no problem list on file for this patient. Patient ID: Marlene Dwyer is a 74 y.o. male. HPI     The following portions of the patient's history were reviewed and updated as appropriate:      family history is not on file. reports that he has never smoked. He does not have any smokeless tobacco history on file. He reports that he does not use drugs. No history on file for alcohol use. Objective:  Patient's shoes and socks removed.    Foot ExamPhysical Exam       Foot Exam     General  General Appearance: appears stated age and healthy   Orientation: alert and oriented to person, place, and time   Affect: appropriate   Gait: antalgic         Right Foot/Ankle      Inspection and Palpation  Tenderness: metatarsals   Swelling: dorsum   Arch: pes planus  Skin Exam: dry skin;      Neurovascular  Dorsalis pedis: 1+  Posterior tibial: 3+        Left Foot/Ankle       Inspection and Palpation  Tenderness: metatarsals   Swelling: dorsum   Arch: pes planus  Skin Exam: dry skin;      Neurovascular  Dorsalis pedis: 1+  Posterior tibial: 3+           Physical Exam  Vitals and nursing note reviewed. Constitutional:       Appearance: Normal appearance. Cardiovascular:      Rate and Rhythm: Normal rate and regular rhythm. Pulses:           Dorsalis pedis pulses are 1+ on the right side and 1+ on the left side. Posterior tibial pulses are 3+ on the right side and 3+ on the left side. Feet:      Right foot:      Skin integrity: Dry skin present. Left foot:      Skin integrity: Dry skin present. Skin:     Capillary Refill: Capillary refill takes less than 2 seconds. Comments: Nails are dystrophic. Nails demonstrate distal mycosis. Right hallux demonstrates ulcerated fibular nail groove. Negative pus or infection. Negative ingrown toenail    Negative pus   Neurological:      Mental Status: He is alert. Psychiatric:         Mood and Affect: Mood normal.         Behavior: Behavior normal.         Thought Content:  Thought content normal.         Judgment: Judgment normal.

## 2024-01-17 ENCOUNTER — OFFICE VISIT (OUTPATIENT)
Age: 74
End: 2024-01-17
Payer: MEDICARE

## 2024-01-17 VITALS
SYSTOLIC BLOOD PRESSURE: 130 MMHG | DIASTOLIC BLOOD PRESSURE: 80 MMHG | HEIGHT: 72 IN | HEART RATE: 62 BPM | BODY MASS INDEX: 29.12 KG/M2 | RESPIRATION RATE: 17 BRPM | WEIGHT: 215 LBS

## 2024-01-17 DIAGNOSIS — M79.672 PAIN IN BOTH FEET: Primary | ICD-10-CM

## 2024-01-17 DIAGNOSIS — M21.962 ACQUIRED DEFORMITY OF BOTH FEET: ICD-10-CM

## 2024-01-17 DIAGNOSIS — M21.961 ACQUIRED DEFORMITY OF BOTH FEET: ICD-10-CM

## 2024-01-17 DIAGNOSIS — B35.1 ONYCHOMYCOSIS: ICD-10-CM

## 2024-01-17 DIAGNOSIS — M79.671 PAIN IN BOTH FEET: Primary | ICD-10-CM

## 2024-01-17 PROCEDURE — 99212 OFFICE O/P EST SF 10 MIN: CPT | Performed by: PODIATRIST

## 2024-01-17 NOTE — PROGRESS NOTES
Assessment/Plan: Open wound of toe.  Resolved paronychia.  Acquired deformity foot bilateral.  Mycosis of nail, resolving.  Pain upon ambulation.     Plan.  Chart reviewed.  ECP notes reviewed.  Lab work reviewed.  Patient advised on condition.  Patient may have had reaction to Lamisil.  He had elevation of inflammatory markers.  At this time we recommend topical antifungal.  Patient advised on nail care.  Patient will watch for signs of infection.  Aftercare instruction given.  Return for follow-up.  In addition patient advised on proper shoe sizing.            Diagnoses and all orders for this visit:     Pain in both feet     Acquired deformity of both feet     Onychomycosis               Subjective: Patient is doing well.  Took medication as prescribed.  He believes it elevated his inflammatory markers     No Known Allergies        Current Outpatient Medications:     terbinafine (LamISIL) 250 mg tablet, 1 tab p.o. every other day., Disp: 15 tablet, Rfl: 0    aspirin 81 MG tablet, Take 81 mg by mouth daily., Disp: , Rfl:     atorvastatin (LIPITOR) 10 mg tablet, Take 10 mg by mouth daily., Disp: , Rfl:     folic acid (FOLVITE) 1 mg tablet, Take 1 mg by mouth daily., Disp: , Rfl:     pantoprazole (PROTONIX) 40 mg tablet, Take 40 mg by mouth daily., Disp: , Rfl:      There is no problem list on file for this patient.            Patient ID: Fredy Solis is a 73 y.o. male.     HPI     The following portions of the patient's history were reviewed and updated as appropriate:      family history is not on file.       reports that he has never smoked. He does not have any smokeless tobacco history on file. He reports that he does not use drugs. No history on file for alcohol use.        Objective:  Patient's shoes and socks removed.   Foot ExamPhysical Exam       Foot Exam     General  General Appearance: appears stated age and healthy   Orientation: alert and oriented to person, place, and time   Affect: appropriate    Gait: antalgic         Right Foot/Ankle      Inspection and Palpation  Tenderness: metatarsals   Swelling: dorsum   Arch: pes planus  Skin Exam: dry skin;      Neurovascular  Dorsalis pedis: 1+  Posterior tibial: 3+        Left Foot/Ankle       Inspection and Palpation  Tenderness: metatarsals   Swelling: dorsum   Arch: pes planus  Skin Exam: dry skin;      Neurovascular  Dorsalis pedis: 1+  Posterior tibial: 3+           Physical Exam  Vitals and nursing note reviewed.   Constitutional:       Appearance: Normal appearance.   Cardiovascular:      Rate and Rhythm: Normal rate and regular rhythm.      Pulses:           Dorsalis pedis pulses are 1+ on the right side and 1+ on the left side.        Posterior tibial pulses are 3+ on the right side and 3+ on the left side.   Feet:      Right foot:      Skin integrity: Dry skin present.      Left foot:      Skin integrity: Dry skin present.   Skin:     Capillary Refill: Capillary refill takes less than 2 seconds.      Comments: Nails are dystrophic.  Nails demonstrate distal mycosis.  Right hallux demonstrates ulcerated fibular nail groove.  Negative pus or infection.  Negative ingrown toenail    Negative pus   Neurological:      Mental Status: He is alert.   Psychiatric:         Mood and Affect: Mood normal.         Behavior: Behavior normal.         Thought Content: Thought content normal.         Judgment: Judgment normal.

## 2024-03-27 ENCOUNTER — OFFICE VISIT (OUTPATIENT)
Age: 74
End: 2024-03-27
Payer: MEDICARE

## 2024-03-27 VITALS
DIASTOLIC BLOOD PRESSURE: 61 MMHG | RESPIRATION RATE: 17 BRPM | SYSTOLIC BLOOD PRESSURE: 103 MMHG | HEIGHT: 72 IN | BODY MASS INDEX: 29.12 KG/M2 | WEIGHT: 215 LBS | HEART RATE: 59 BPM

## 2024-03-27 DIAGNOSIS — L60.0 INGROWN TOENAIL: ICD-10-CM

## 2024-03-27 DIAGNOSIS — M21.962 ACQUIRED DEFORMITY OF BOTH FEET: ICD-10-CM

## 2024-03-27 DIAGNOSIS — B35.1 ONYCHOMYCOSIS: ICD-10-CM

## 2024-03-27 DIAGNOSIS — M79.671 PAIN IN BOTH FEET: Primary | ICD-10-CM

## 2024-03-27 DIAGNOSIS — M79.672 PAIN IN BOTH FEET: Primary | ICD-10-CM

## 2024-03-27 DIAGNOSIS — L03.031 PARONYCHIA OF TOENAIL OF RIGHT FOOT: ICD-10-CM

## 2024-03-27 DIAGNOSIS — M21.961 ACQUIRED DEFORMITY OF BOTH FEET: ICD-10-CM

## 2024-03-27 PROCEDURE — 99212 OFFICE O/P EST SF 10 MIN: CPT | Performed by: PODIATRIST

## 2024-03-27 NOTE — PROGRESS NOTES
Assessment/Plan: Open wound of toe.  Resolved paronychia.  Acquired deformity foot bilateral.  Mycosis of nail, resolving.  Pain upon ambulation.     Plan.  Chart reviewed.  PCP notes reviewed.  Lab work reviewed.  Patient advised on condition.  Patient may have had reaction to Lamisil.  He had elevation of inflammatory markers.  At this time we recommend topical antifungal.  Patient advised on nail care.  Patient will watch for signs of infection.  Aftercare instruction given.  Return for follow-up.  In addition patient advised on proper shoe sizing.            Diagnoses and all orders for this visit:     Pain in both feet     Acquired deformity of both feet     Onychomycosis               Subjective: Patient is doing well.  Took medication as prescribed.  He believes it elevated his inflammatory markers     No Known Allergies        Current Outpatient Medications:     terbinafine (LamISIL) 250 mg tablet, 1 tab p.o. every other day., Disp: 15 tablet, Rfl: 0    aspirin 81 MG tablet, Take 81 mg by mouth daily., Disp: , Rfl:     atorvastatin (LIPITOR) 10 mg tablet, Take 10 mg by mouth daily., Disp: , Rfl:     folic acid (FOLVITE) 1 mg tablet, Take 1 mg by mouth daily., Disp: , Rfl:     pantoprazole (PROTONIX) 40 mg tablet, Take 40 mg by mouth daily., Disp: , Rfl:      There is no problem list on file for this patient.            Patient ID: Fredy Solis is a 74 y.o. male.     HPI     The following portions of the patient's history were reviewed and updated as appropriate:      family history is not on file.       reports that he has never smoked. He does not have any smokeless tobacco history on file. He reports that he does not use drugs. No history on file for alcohol use.        Objective:  Patient's shoes and socks removed.   Foot ExamPhysical Exam       Foot Exam     General  General Appearance: appears stated age and healthy   Orientation: alert and oriented to person, place, and time   Affect: appropriate    Gait: antalgic         Right Foot/Ankle      Inspection and Palpation  Tenderness: metatarsals   Swelling: dorsum   Arch: pes planus  Skin Exam: dry skin;      Neurovascular  Dorsalis pedis: 1+  Posterior tibial: 3+        Left Foot/Ankle       Inspection and Palpation  Tenderness: metatarsals   Swelling: dorsum   Arch: pes planus  Skin Exam: dry skin;      Neurovascular  Dorsalis pedis: 1+  Posterior tibial: 3+           Physical Exam  Vitals and nursing note reviewed.   Constitutional:       Appearance: Normal appearance.   Cardiovascular:      Rate and Rhythm: Normal rate and regular rhythm.      Pulses:           Dorsalis pedis pulses are 1+ on the right side and 1+ on the left side.        Posterior tibial pulses are 3+ on the right side and 3+ on the left side.   Feet:      Right foot:      Skin integrity: Dry skin present.      Left foot:      Skin integrity: Dry skin present.   Skin:     Capillary Refill: Capillary refill takes less than 2 seconds.      Comments: Nails are dystrophic.  Nails demonstrate distal mycosis.  Right hallux demonstrates ulcerated fibular nail groove.  Negative pus or infection.  Negative ingrown toenail    Negative pus   Neurological:      Mental Status: He is alert.   Psychiatric:         Mood and Affect: Mood normal.         Behavior: Behavior normal.         Thought Content: Thought content normal.         Judgment: Judgment normal.

## 2024-05-15 ENCOUNTER — TELEPHONE (OUTPATIENT)
Age: 74
End: 2024-05-15

## 2024-05-15 NOTE — TELEPHONE ENCOUNTER
Lmom for patient to call back with information if he has seen his PCP with in the past 6 months , if patient has not seen his PCP we would need to r/s his appointment

## 2024-05-20 ENCOUNTER — TELEPHONE (OUTPATIENT)
Age: 74
End: 2024-05-20

## 2024-05-20 NOTE — TELEPHONE ENCOUNTER
Lmom for patient in regards to his appointment , patient PCP office stated he has not seen them within the past 6 month , due to Medicare guidelines the patient would need to see his PCP before coming in to see dr mijares .

## 2024-05-20 NOTE — TELEPHONE ENCOUNTER
Spoke with patient PCP office they stated the patient has not been seen at the office in the past 6 month , we will call the patient and make him aware .

## 2024-12-03 ENCOUNTER — OFFICE VISIT (OUTPATIENT)
Age: 74
End: 2024-12-03
Payer: MEDICARE

## 2024-12-03 VITALS
HEIGHT: 72 IN | DIASTOLIC BLOOD PRESSURE: 75 MMHG | SYSTOLIC BLOOD PRESSURE: 123 MMHG | HEART RATE: 62 BPM | RESPIRATION RATE: 17 BRPM | BODY MASS INDEX: 29.12 KG/M2 | WEIGHT: 215 LBS

## 2024-12-03 DIAGNOSIS — L03.031 PARONYCHIA OF TOENAIL OF RIGHT FOOT: ICD-10-CM

## 2024-12-03 DIAGNOSIS — M79.671 PAIN IN BOTH FEET: Primary | ICD-10-CM

## 2024-12-03 DIAGNOSIS — R60.9 CHRONIC EDEMA: ICD-10-CM

## 2024-12-03 DIAGNOSIS — B35.1 ONYCHOMYCOSIS: ICD-10-CM

## 2024-12-03 DIAGNOSIS — M79.672 PAIN IN BOTH FEET: Primary | ICD-10-CM

## 2024-12-03 DIAGNOSIS — M21.962 ACQUIRED DEFORMITY OF BOTH FEET: ICD-10-CM

## 2024-12-03 DIAGNOSIS — M21.961 ACQUIRED DEFORMITY OF BOTH FEET: ICD-10-CM

## 2024-12-03 PROCEDURE — 99213 OFFICE O/P EST LOW 20 MIN: CPT | Performed by: PODIATRIST

## 2024-12-03 RX ORDER — AMLODIPINE BESYLATE 5 MG/1
10 TABLET ORAL
COMMUNITY
Start: 2024-10-09

## 2024-12-03 RX ORDER — CARVEDILOL 3.12 MG/1
3.12 TABLET ORAL
COMMUNITY
Start: 2024-09-04 | End: 2025-09-04

## 2024-12-03 RX ORDER — BIMATOPROST 0.1 MG/ML
SOLUTION/ DROPS OPHTHALMIC
COMMUNITY
Start: 2024-11-05

## 2024-12-03 RX ORDER — DOXYCYCLINE HYCLATE 20 MG
TABLET ORAL
COMMUNITY
Start: 2024-11-30

## 2024-12-03 NOTE — PROGRESS NOTES
Assessment/Plan: Open wound of toe.  Resolved paronychia.  Acquired deformity foot bilateral.  Mycosis of nail, resolving.  Pain upon ambulation.  Chronic edema     Plan.  Chart reviewed.  PCP notes reviewed.  Lab work reviewed.  Patient advised on condition.  Patient may have had reaction to Lamisil.  He had elevation of inflammatory markers.  At this time we recommend topical antifungal.  Patient advised on nail care.  Patient will watch for signs of infection.  Aftercare instruction given.  Return for follow-up.  In addition patient advised on proper shoe sizing.    In order to help with chronic edema patient will take diuretic as directed by cardiology.  In addition he will consider Jobst compression stockings.            Diagnoses and all orders for this visit:     Pain in both feet     Acquired deformity of both feet     Onychomycosis               Subjective: Patient is doing well.  Took medication as prescribed.  He believes it elevated his inflammatory markers     No Known Allergies        Current Outpatient Medications:     terbinafine (LamISIL) 250 mg tablet, 1 tab p.o. every other day., Disp: 15 tablet, Rfl: 0    aspirin 81 MG tablet, Take 81 mg by mouth daily., Disp: , Rfl:     atorvastatin (LIPITOR) 10 mg tablet, Take 10 mg by mouth daily., Disp: , Rfl:     folic acid (FOLVITE) 1 mg tablet, Take 1 mg by mouth daily., Disp: , Rfl:     pantoprazole (PROTONIX) 40 mg tablet, Take 40 mg by mouth daily., Disp: , Rfl:      There is no problem list on file for this patient.            Patient ID: Fredy Solis is a 74 y.o. male.     HPI     The following portions of the patient's history were reviewed and updated as appropriate:      family history is not on file.       reports that he has never smoked. He does not have any smokeless tobacco history on file. He reports that he does not use drugs. No history on file for alcohol use.        Objective:  Patient's shoes and socks removed.   Foot ExamPhysical  Exam       Foot Exam     General  General Appearance: appears stated age and healthy   Orientation: alert and oriented to person, place, and time   Affect: appropriate   Gait: antalgic         Right Foot/Ankle      Inspection and Palpation  Tenderness: metatarsals   Swelling: dorsum   Arch: pes planus  Skin Exam: dry skin;      Neurovascular  Dorsalis pedis: 1+  Posterior tibial: 3+        Left Foot/Ankle       Inspection and Palpation  Tenderness: metatarsals   Swelling: dorsum   Arch: pes planus  Skin Exam: dry skin;      Neurovascular  Dorsalis pedis: 1+  Posterior tibial: 3+           Physical Exam  Vitals and nursing note reviewed.   Constitutional:       Appearance: Normal appearance.   Cardiovascular:      Rate and Rhythm: Normal rate and regular rhythm.      Pulses:           Dorsalis pedis pulses are 1+ on the right side and 1+ on the left side.        Posterior tibial pulses are 3+ on the right side and 3+ on the left side.     1/4 pitting edema bilateral.  Negative Homans' sign.      Feet:      Right foot:      Skin integrity: Dry skin present.      Left foot:      Skin integrity: Dry skin present.   Skin:     Capillary Refill: Capillary refill takes less than 2 seconds.      Comments: Nails are dystrophic.  Nails demonstrate distal mycosis.  Right hallux demonstrates ulcerated fibular nail groove.  Negative pus or infection.  Negative ingrown toenail    Negative pus   Neurological:      Mental Status: He is alert.   Psychiatric:         Mood and Affect: Mood normal.         Behavior: Behavior normal.         Thought Content: Thought content normal.         Judgment: Judgment normal.

## 2024-12-17 RX ORDER — LOSARTAN POTASSIUM 50 MG/1
50 TABLET ORAL DAILY
COMMUNITY

## 2024-12-17 RX ORDER — EZETIMIBE 10 MG/1
10 TABLET ORAL DAILY
COMMUNITY

## 2024-12-17 NOTE — PRE-PROCEDURE INSTRUCTIONS
Pre-Surgery Instructions:   Medication Instructions    amLODIPine (NORVASC) 5 mg tablet Take day of surgery.    aspirin 81 MG tablet Hold day of surgery.    atorvastatin (LIPITOR) 10 mg tablet Take night before surgery    doxycycline (PERIOSTAT) 20 MG tablet Hold day of surgery.    ezetimibe (ZETIA) 10 mg tablet Hold day of surgery.    folic acid (FOLVITE) 1 mg tablet Hold day of surgery.    losartan (COZAAR) 50 mg tablet Hold day of surgery.    Lumigan 0.01 % ophthalmic drops Instructions provided by MD    pantoprazole (PROTONIX) 40 mg tablet Take day of surgery.    Medication instructions for day surgery reviewed. Please use only a sip of water to take your instructed medications. Avoid all over the counter vitamins, supplements and NSAIDS for one week prior to surgery per anesthesia guidelines. Tylenol is ok to take as needed.     You will receive a call one business day prior to surgery with an arrival time and hospital directions. If your surgery is scheduled on a Monday, the hospital will be calling you on the Friday prior to your surgery. If you have not heard from anyone by 8pm, please call the hospital supervisor through the hospital  at [phone]. ([place] [phone] or [place] [phone]).    Do not eat or drink anything after midnight the night before your surgery, including candy, mints, lifesavers, or chewing gum. Do not drink alcohol 24hrs before your surgery. Try not to smoke at least 24hrs before your surgery.       Follow the pre surgery showering instructions as listed in the “My Surgical Experience Booklet” or otherwise provided by your surgeon's office. Do not use a blade to shave the surgical area 1 week before surgery. It is okay to use a clean electric clippers up to 24 hours before surgery. Do not apply any lotions, creams, including makeup, cologne, deodorant, or perfumes after showering on the day of your surgery. Do not use dry shampoo, hair spray, hair gel, or any type  no of hair products.     No contact lenses, eye make-up, or artificial eyelashes. Remove nail polish, including gel polish, and any artificial, gel, or acrylic nails if possible. Remove all jewelry including rings and body piercing jewelry.     Wear causal clothing that is easy to take on and off. Consider your type of surgery.    Keep any valuables, jewelry, piercings at home. Please bring any specially ordered equipment (sling, braces) if indicated.    Arrange for a responsible person to drive you to and from the hospital on the day of your surgery. Please confirm the visitor policy for the day of your procedure when you receive your phone call with an arrival time.     Call the surgeon's office with any new illnesses, exposures, or additional questions prior to surgery.    Please reference your “My Surgical Experience Booklet” for additional information to prepare for your upcoming surgery.

## 2024-12-30 ENCOUNTER — ANESTHESIA (OUTPATIENT)
Dept: PERIOP | Facility: AMBULARY SURGERY CENTER | Age: 74
End: 2024-12-30
Payer: MEDICARE

## 2024-12-30 ENCOUNTER — ANESTHESIA EVENT (OUTPATIENT)
Dept: PERIOP | Facility: AMBULARY SURGERY CENTER | Age: 74
End: 2024-12-30
Payer: MEDICARE

## 2024-12-30 ENCOUNTER — HOSPITAL ENCOUNTER (OUTPATIENT)
Facility: AMBULARY SURGERY CENTER | Age: 74
Setting detail: OUTPATIENT SURGERY
Discharge: HOME | End: 2024-12-30
Attending: OPHTHALMOLOGY | Admitting: OPHTHALMOLOGY
Payer: MEDICARE

## 2024-12-30 VITALS
WEIGHT: 195 LBS | RESPIRATION RATE: 18 BRPM | HEART RATE: 56 BPM | SYSTOLIC BLOOD PRESSURE: 155 MMHG | HEIGHT: 71 IN | BODY MASS INDEX: 27.3 KG/M2 | DIASTOLIC BLOOD PRESSURE: 71 MMHG | OXYGEN SATURATION: 95 % | TEMPERATURE: 97.3 F

## 2024-12-30 DIAGNOSIS — H25.812 COMBINED FORMS OF AGE-RELATED CATARACT OF LEFT EYE: Primary | ICD-10-CM

## 2024-12-30 PROCEDURE — V2632 POST CHMBR INTRAOCULAR LENS: HCPCS | Performed by: OPHTHALMOLOGY

## 2024-12-30 DEVICE — STERILE UV AND BLUE LIGHT FILTERING ACRYLIC FOLDABLE ASPHERIC POSTERIOR CHAMBER INTRAOCULAR LENS
Type: IMPLANTABLE DEVICE | Site: EYE | Status: FUNCTIONAL
Brand: CLAREON

## 2024-12-30 RX ORDER — GATIFLOXACIN 5 MG/ML
SOLUTION/ DROPS OPHTHALMIC AS NEEDED
Status: DISCONTINUED | OUTPATIENT
Start: 2024-12-30 | End: 2024-12-30 | Stop reason: HOSPADM

## 2024-12-30 RX ORDER — LIDOCAINE HYDROCHLORIDE 20 MG/ML
1 JELLY TOPICAL
Status: COMPLETED | OUTPATIENT
Start: 2024-12-30 | End: 2024-12-30

## 2024-12-30 RX ORDER — TETRACAINE HYDROCHLORIDE 5 MG/ML
SOLUTION OPHTHALMIC AS NEEDED
Status: DISCONTINUED | OUTPATIENT
Start: 2024-12-30 | End: 2024-12-30 | Stop reason: HOSPADM

## 2024-12-30 RX ORDER — PHENYLEPHRINE HYDROCHLORIDE 25 MG/ML
1 SOLUTION/ DROPS OPHTHALMIC
Status: COMPLETED | OUTPATIENT
Start: 2024-12-30 | End: 2024-12-30

## 2024-12-30 RX ORDER — CYCLOPENTOLATE HYDROCHLORIDE 10 MG/ML
1 SOLUTION/ DROPS OPHTHALMIC
Status: COMPLETED | OUTPATIENT
Start: 2024-12-30 | End: 2024-12-30

## 2024-12-30 RX ORDER — GATIFLOXACIN 5 MG/ML
1 SOLUTION/ DROPS OPHTHALMIC 2 TIMES DAILY
Start: 2024-12-30

## 2024-12-30 RX ORDER — TETRACAINE HYDROCHLORIDE 5 MG/ML
1 SOLUTION OPHTHALMIC ONCE
Status: COMPLETED | OUTPATIENT
Start: 2024-12-30 | End: 2024-12-30

## 2024-12-30 RX ORDER — KETOROLAC TROMETHAMINE 5 MG/ML
1 SOLUTION OPHTHALMIC 4 TIMES DAILY
Start: 2024-12-30

## 2024-12-30 RX ORDER — KETOROLAC TROMETHAMINE 5 MG/ML
1 SOLUTION OPHTHALMIC
Status: COMPLETED | OUTPATIENT
Start: 2024-12-30 | End: 2024-12-30

## 2024-12-30 RX ORDER — BALANCED SALT SOLUTION 6.4; .75; .48; .3; 3.9; 1.7 MG/ML; MG/ML; MG/ML; MG/ML; MG/ML; MG/ML
SOLUTION OPHTHALMIC AS NEEDED
Status: DISCONTINUED | OUTPATIENT
Start: 2024-12-30 | End: 2024-12-30 | Stop reason: HOSPADM

## 2024-12-30 RX ORDER — MIDAZOLAM HYDROCHLORIDE 2 MG/2ML
INJECTION, SOLUTION INTRAMUSCULAR; INTRAVENOUS AS NEEDED
Status: DISCONTINUED | OUTPATIENT
Start: 2024-12-30 | End: 2024-12-30

## 2024-12-30 RX ORDER — POVIDONE-IODINE 5 %
SOLUTION, NON-ORAL OPHTHALMIC (EYE) AS NEEDED
Status: DISCONTINUED | OUTPATIENT
Start: 2024-12-30 | End: 2024-12-30 | Stop reason: HOSPADM

## 2024-12-30 RX ORDER — PREDNISOLONE ACETATE 10 MG/ML
1 SUSPENSION/ DROPS OPHTHALMIC 4 TIMES DAILY
Start: 2024-12-30

## 2024-12-30 RX ADMIN — KETOROLAC TROMETHAMINE 1 DROP: 5 SOLUTION OPHTHALMIC at 10:01

## 2024-12-30 RX ADMIN — TETRACAINE HYDROCHLORIDE 1 DROP: 5 SOLUTION OPHTHALMIC at 09:13

## 2024-12-30 RX ADMIN — CYCLOPENTOLATE HYDROCHLORIDE 1 DROP: 10 SOLUTION/ DROPS OPHTHALMIC at 09:33

## 2024-12-30 RX ADMIN — LIDOCAINE HYDROCHLORIDE 1 APPLICATION: 20 JELLY TOPICAL at 09:43

## 2024-12-30 RX ADMIN — KETOROLAC TROMETHAMINE 1 DROP: 5 SOLUTION OPHTHALMIC at 09:43

## 2024-12-30 RX ADMIN — CYCLOPENTOLATE HYDROCHLORIDE 1 DROP: 10 SOLUTION/ DROPS OPHTHALMIC at 10:01

## 2024-12-30 RX ADMIN — LIDOCAINE HYDROCHLORIDE 1 APPLICATION: 20 JELLY TOPICAL at 09:16

## 2024-12-30 RX ADMIN — CYCLOPENTOLATE HYDROCHLORIDE 1 DROP: 10 SOLUTION/ DROPS OPHTHALMIC at 09:13

## 2024-12-30 RX ADMIN — PHENYLEPHRINE HYDROCHLORIDE 1 DROP: 25 SOLUTION/ DROPS OPHTHALMIC at 09:43

## 2024-12-30 RX ADMIN — KETOROLAC TROMETHAMINE 1 DROP: 5 SOLUTION OPHTHALMIC at 09:14

## 2024-12-30 RX ADMIN — PHENYLEPHRINE HYDROCHLORIDE 1 DROP: 25 SOLUTION/ DROPS OPHTHALMIC at 10:01

## 2024-12-30 RX ADMIN — PHENYLEPHRINE HYDROCHLORIDE 1 DROP: 25 SOLUTION/ DROPS OPHTHALMIC at 09:14

## 2024-12-30 RX ADMIN — KETOROLAC TROMETHAMINE 1 DROP: 5 SOLUTION OPHTHALMIC at 09:33

## 2024-12-30 RX ADMIN — MIDAZOLAM 2 MG: 1 INJECTION INTRAMUSCULAR; INTRAVENOUS at 10:11

## 2024-12-30 RX ADMIN — LIDOCAINE HYDROCHLORIDE 1 APPLICATION: 20 JELLY TOPICAL at 09:34

## 2024-12-30 RX ADMIN — PHENYLEPHRINE HYDROCHLORIDE 1 DROP: 25 SOLUTION/ DROPS OPHTHALMIC at 09:34

## 2024-12-30 RX ADMIN — CYCLOPENTOLATE HYDROCHLORIDE 1 DROP: 10 SOLUTION/ DROPS OPHTHALMIC at 09:43

## 2024-12-30 NOTE — DISCHARGE INSTR - AVS FIRST PAGE
Dr. Steve Cataract Instructions    Activity:     1.  No Driving until instructed   2.  Keep shield on until seen tomorrow except when administering drops   3.  No heavy lifting   4.  No water in eye     Diet:     1.  Resume normal diet    Normal Symptoms:     1.  Mild Headache   2. Scratchy or picky feeling around eye    Call the office if:     1.  You have any questions or concerns   2.  If eye pain is not relieved by extra strength tylenol    Office phone number:  [phone]      Next appointment:     1.  See Dr Steve at his office tomorrow as scheduled   __________________________________________________________   2.  Bring blue eye kit with you and eyedrops to the office    A new set of comprehensive instructions will be given and reviewed with you during your office visit tomorrow.

## 2024-12-30 NOTE — ANESTHESIA PREPROCEDURE EVALUATION
Procedure:  EXTRACTION EXTRACAPSULAR CATARACT PHACO INTRAOCULAR LENS (IOL) (Left: Eye)    Relevant Problems   No relevant active problems      GERD  HTN  HLD  CKD    Physical Exam    Airway    Mallampati score: II  TM Distance: >3 FB  Neck ROM: full     Dental   No notable dental hx     Cardiovascular      Pulmonary      Other Findings        Anesthesia Plan  ASA Score- 2     Anesthesia Type- IV sedation with anesthesia with ASA Monitors.         Additional Monitors:     Airway Plan:            Plan Factors-    Chart reviewed.    Patient summary reviewed.                  Induction- intravenous.    Postoperative Plan-     Perioperative Resuscitation Plan - Level 1 - Full Code.       Informed Consent- Anesthetic plan and risks discussed with patient.  I personally reviewed this patient with the CRNA. Discussed and agreed on the Anesthesia Plan with the CRNA..

## 2024-12-30 NOTE — OP NOTE
OPERATIVE REPORT    PATIENT NAME: Fredy Solis    :  1950  MRN: [id number]  Pt Location: [facility] OR ROOM 01    Surgery Date: 2024    Surgeons and Role:     * Carlos Steve MD - Primary    Age-related nuclear cataract, left eye [H25.12]  Posterior subcapsular polar age-related cataract, left eye [H25.042]    Post-Op Diagnosis Codes:     * Age-related nuclear cataract, left eye [H25.12]     * Posterior subcapsular polar age-related cataract, left eye [H25.042]    Procedure(s):  EXTRACTION EXTRACAPSULAR CATARACT PHACO INTRAOCULAR LENS (IOL)    Anesthesia Type:   IV Sedation with Anesthesia    Operative Indications:  Age-related nuclear cataract, left eye [H25.12]  Posterior subcapsular polar age-related cataract, left eye [H25.042]  Decreased vision to 20/40. With problems driving.  Pt requested cataract sx the left eye    Procedure and Technique:    Procedure Details     The patient was brought in the OR in stable condition and placed on the operative table. The left eye was prepped and draped in the usual sterile manner. Attention was directed to the left eye where a lid speculum was placed. A 2.4 mm clear corneal incision was made temperally. 1 cc of Shugarcaine was irrigated into the anterior chamber followed by viscoat. The side port incision was placed superiorly. The capsularrhexis was made and the nucleus was hydrodissected with BSS. The nucleus was then removed with the phaco handpiece followed by removal of the cortical material with the I/A handpiece. The capsular bag was then filled with Provisc. The IOL was folded and placed in to the capsular bag and centered well. The remaining Provisc was removed from the eye with the I/A. The wounds were hydrated with BSS and found to be water tight. The lid speculum was removed and 2 drops of Gatifloxicin were placed over the cornea. A protective eye shield was taped over the eye and the patient went to PACU in stable condition. I will see the  patient in the office tomorrow and the expected post op period is a few weeks.       Complications: None        Disposition: PACU   Condition: Stable    SIGNATURE: Carlos Steve MD  DATE: December 30, 2024  TIME: 10:36 AM

## 2025-01-22 NOTE — PRE-PROCEDURE INSTRUCTIONS
Pre-Surgery Instructions:   Medication Instructions    amLODIPine (NORVASC) 5 mg tablet Take night before surgery    aspirin 81 MG tablet Hold day of surgery.    atorvastatin (LIPITOR) 10 mg tablet Take night before surgery    doxycycline (PERIOSTAT) 20 MG tablet Hold day of surgery.    ezetimibe (ZETIA) 10 mg tablet Take night before surgery    losartan (COZAAR) 50 mg tablet Hold day of surgery.    Lumigan 0.01 % ophthalmic drops Take night before surgery    pantoprazole (PROTONIX) 40 mg tablet Take day of surgery.    Medication instructions for day surgery reviewed. Please use only a sip of water to take your instructed medications. Avoid all over the counter vitamins, supplements and NSAIDS for one week prior to surgery per anesthesia guidelines. Tylenol is ok to take as needed.     You will receive a call one business day prior to surgery with an arrival time and hospital directions. If your surgery is scheduled on a Monday, the hospital will be calling you on the Friday prior to your surgery. If you have not heard from anyone by 8pm, please call the hospital supervisor through the hospital  at [phone]. ([place] [phone] or [place] [phone]).    Do not eat or drink anything after midnight the night before your surgery, including candy, mints, lifesavers, or chewing gum. Do not drink alcohol 24hrs before your surgery. Try not to smoke at least 24hrs before your surgery.       Follow the pre surgery showering instructions as listed in the “My Surgical Experience Booklet” or otherwise provided by your surgeon's office. Do not use a blade to shave the surgical area 1 week before surgery. It is okay to use a clean electric clippers up to 24 hours before surgery. Do not apply any lotions, creams, including makeup, cologne, deodorant, or perfumes after showering on the day of your surgery. Do not use dry shampoo, hair spray, hair gel, or any type of hair products.     No contact lenses, eye  make-up, or artificial eyelashes. Remove nail polish, including gel polish, and any artificial, gel, or acrylic nails if possible. Remove all jewelry including rings and body piercing jewelry.     Wear causal clothing that is easy to take on and off. Consider your type of surgery.    Keep any valuables, jewelry, piercings at home. Please bring any specially ordered equipment (sling, braces) if indicated.    Arrange for a responsible person to drive you to and from the hospital on the day of your surgery. Please confirm the visitor policy for the day of your procedure when you receive your phone call with an arrival time.     Call the surgeon's office with any new illnesses, exposures, or additional questions prior to surgery.    Please reference your “My Surgical Experience Booklet” for additional information to prepare for your upcoming surgery.

## 2025-01-27 ENCOUNTER — ANESTHESIA EVENT (OUTPATIENT)
Dept: PERIOP | Facility: AMBULARY SURGERY CENTER | Age: 75
End: 2025-01-27
Payer: MEDICARE

## 2025-01-27 ENCOUNTER — ANESTHESIA (OUTPATIENT)
Dept: PERIOP | Facility: AMBULARY SURGERY CENTER | Age: 75
End: 2025-01-27
Payer: MEDICARE

## 2025-01-27 ENCOUNTER — HOSPITAL ENCOUNTER (OUTPATIENT)
Facility: AMBULARY SURGERY CENTER | Age: 75
Setting detail: OUTPATIENT SURGERY
Discharge: HOME | End: 2025-01-27
Attending: OPHTHALMOLOGY | Admitting: OPHTHALMOLOGY
Payer: MEDICARE

## 2025-01-27 VITALS
HEART RATE: 62 BPM | RESPIRATION RATE: 18 BRPM | DIASTOLIC BLOOD PRESSURE: 67 MMHG | SYSTOLIC BLOOD PRESSURE: 143 MMHG | OXYGEN SATURATION: 97 % | TEMPERATURE: 98.6 F

## 2025-01-27 DIAGNOSIS — H25.041 POSTERIOR SUBCAPSULAR POLAR AGE-RELATED CATARACT OF RIGHT EYE: Primary | ICD-10-CM

## 2025-01-27 PROCEDURE — V2632 POST CHMBR INTRAOCULAR LENS: HCPCS | Performed by: OPHTHALMOLOGY

## 2025-01-27 DEVICE — STERILE UV AND BLUE LIGHT FILTERING ACRYLIC FOLDABLE ASPHERIC POSTERIOR CHAMBER INTRAOCULAR LENS
Type: IMPLANTABLE DEVICE | Site: EYE | Status: FUNCTIONAL
Brand: CLAREON

## 2025-01-27 RX ORDER — PHENYLEPHRINE HYDROCHLORIDE 25 MG/ML
1 SOLUTION/ DROPS OPHTHALMIC
Status: COMPLETED | OUTPATIENT
Start: 2025-01-27 | End: 2025-01-27

## 2025-01-27 RX ORDER — BALANCED SALT SOLUTION 6.4; .75; .48; .3; 3.9; 1.7 MG/ML; MG/ML; MG/ML; MG/ML; MG/ML; MG/ML
SOLUTION OPHTHALMIC AS NEEDED
Status: DISCONTINUED | OUTPATIENT
Start: 2025-01-27 | End: 2025-01-27 | Stop reason: HOSPADM

## 2025-01-27 RX ORDER — MIDAZOLAM HYDROCHLORIDE 2 MG/2ML
INJECTION, SOLUTION INTRAMUSCULAR; INTRAVENOUS AS NEEDED
Status: DISCONTINUED | OUTPATIENT
Start: 2025-01-27 | End: 2025-01-27

## 2025-01-27 RX ORDER — KETOROLAC TROMETHAMINE 5 MG/ML
1 SOLUTION OPHTHALMIC 4 TIMES DAILY
Start: 2025-01-27

## 2025-01-27 RX ORDER — GATIFLOXACIN 5 MG/ML
1 SOLUTION/ DROPS OPHTHALMIC 2 TIMES DAILY
Start: 2025-01-27

## 2025-01-27 RX ORDER — KETOROLAC TROMETHAMINE 5 MG/ML
1 SOLUTION OPHTHALMIC
Status: COMPLETED | OUTPATIENT
Start: 2025-01-27 | End: 2025-01-27

## 2025-01-27 RX ORDER — GATIFLOXACIN 5 MG/ML
SOLUTION/ DROPS OPHTHALMIC AS NEEDED
Status: DISCONTINUED | OUTPATIENT
Start: 2025-01-27 | End: 2025-01-27 | Stop reason: HOSPADM

## 2025-01-27 RX ORDER — PREDNISOLONE ACETATE 10 MG/ML
1 SUSPENSION/ DROPS OPHTHALMIC 4 TIMES DAILY
Start: 2025-01-27

## 2025-01-27 RX ORDER — LIDOCAINE HYDROCHLORIDE 20 MG/ML
1 JELLY TOPICAL
Status: COMPLETED | OUTPATIENT
Start: 2025-01-27 | End: 2025-01-27

## 2025-01-27 RX ORDER — TETRACAINE HYDROCHLORIDE 5 MG/ML
SOLUTION OPHTHALMIC AS NEEDED
Status: DISCONTINUED | OUTPATIENT
Start: 2025-01-27 | End: 2025-01-27 | Stop reason: HOSPADM

## 2025-01-27 RX ORDER — CYCLOPENTOLATE HYDROCHLORIDE 10 MG/ML
1 SOLUTION/ DROPS OPHTHALMIC
Status: COMPLETED | OUTPATIENT
Start: 2025-01-27 | End: 2025-01-27

## 2025-01-27 RX ORDER — POVIDONE-IODINE 5 %
SOLUTION, NON-ORAL OPHTHALMIC (EYE) AS NEEDED
Status: DISCONTINUED | OUTPATIENT
Start: 2025-01-27 | End: 2025-01-27 | Stop reason: HOSPADM

## 2025-01-27 RX ORDER — TETRACAINE HYDROCHLORIDE 5 MG/ML
1 SOLUTION OPHTHALMIC ONCE
Status: COMPLETED | OUTPATIENT
Start: 2025-01-27 | End: 2025-01-27

## 2025-01-27 RX ADMIN — PHENYLEPHRINE HYDROCHLORIDE 1 DROP: 25 SOLUTION/ DROPS OPHTHALMIC at 09:15

## 2025-01-27 RX ADMIN — LIDOCAINE HYDROCHLORIDE 1 APPLICATION: 20 JELLY TOPICAL at 09:00

## 2025-01-27 RX ADMIN — TETRACAINE HYDROCHLORIDE 1 DROP: 5 SOLUTION OPHTHALMIC at 08:30

## 2025-01-27 RX ADMIN — PHENYLEPHRINE HYDROCHLORIDE 1 DROP: 25 SOLUTION/ DROPS OPHTHALMIC at 08:45

## 2025-01-27 RX ADMIN — CYCLOPENTOLATE HYDROCHLORIDE 1 DROP: 10 SOLUTION OPHTHALMIC at 08:30

## 2025-01-27 RX ADMIN — MIDAZOLAM 2 MG: 1 INJECTION INTRAMUSCULAR; INTRAVENOUS at 09:26

## 2025-01-27 RX ADMIN — CYCLOPENTOLATE HYDROCHLORIDE 1 DROP: 10 SOLUTION OPHTHALMIC at 09:00

## 2025-01-27 RX ADMIN — KETOROLAC TROMETHAMINE 1 DROP: 5 SOLUTION OPHTHALMIC at 09:00

## 2025-01-27 RX ADMIN — KETOROLAC TROMETHAMINE 1 DROP: 5 SOLUTION OPHTHALMIC at 08:30

## 2025-01-27 RX ADMIN — CYCLOPENTOLATE HYDROCHLORIDE 1 DROP: 10 SOLUTION OPHTHALMIC at 08:45

## 2025-01-27 RX ADMIN — CYCLOPENTOLATE HYDROCHLORIDE 1 DROP: 10 SOLUTION OPHTHALMIC at 09:15

## 2025-01-27 RX ADMIN — KETOROLAC TROMETHAMINE 1 DROP: 5 SOLUTION OPHTHALMIC at 09:15

## 2025-01-27 RX ADMIN — LIDOCAINE HYDROCHLORIDE 1 APPLICATION: 20 JELLY TOPICAL at 08:30

## 2025-01-27 RX ADMIN — LIDOCAINE HYDROCHLORIDE 1 APPLICATION: 20 JELLY TOPICAL at 08:45

## 2025-01-27 RX ADMIN — KETOROLAC TROMETHAMINE 1 DROP: 5 SOLUTION OPHTHALMIC at 08:45

## 2025-01-27 RX ADMIN — PHENYLEPHRINE HYDROCHLORIDE 1 DROP: 25 SOLUTION/ DROPS OPHTHALMIC at 09:00

## 2025-01-27 RX ADMIN — PHENYLEPHRINE HYDROCHLORIDE 1 DROP: 25 SOLUTION/ DROPS OPHTHALMIC at 08:30

## 2025-01-27 NOTE — DISCHARGE INSTR - AVS FIRST PAGE
Dr. Steve Cataract Instructions    Activity:     1.  No Driving until instructed   2.  Keep shield on until seen tomorrow except when administering drops   3.  No heavy lifting   4.  No water in eye     Diet:     1.  Resume normal diet    Normal Symptoms:     1.  Mild Headache   2. Scratchy or picky feeling around eye    Call the office if:     1.  You have any questions or concerns   2.  If eye pain is not relieved by extra strength tylenol    Office phone number:  [phone]      Next appointment:     1.  See Dr Steve at his office tomorrow as scheduled   __________________________________________________________   2.  Bring blue eye kit with you and eyedrops to the office    A new set of comprehensive instructions will be given and reviewed with you during your office visit tomorrow.

## 2025-01-27 NOTE — ANESTHESIA POSTPROCEDURE EVALUATION
Post-Op Assessment Note            No anethesia notable event occurred.    Staff: Anesthesiologist           Last Filed PACU Vitals:  Vitals Value Taken Time   Temp     Pulse 62 01/27/25 0946   /67 01/27/25 0946   Resp 18 01/27/25 0946   SpO2 97 % 01/27/25 0946       Modified Gomez:     Vitals Value Taken Time   Activity 2 01/27/25 0946   Respiration 2 01/27/25 0946   Circulation 2 01/27/25 0946   Consciousness 2 01/27/25 0946   Oxygen Saturation 2 01/27/25 0946     Modified Gomez Score: 10

## 2025-01-27 NOTE — OP NOTE
OPERATIVE REPORT    PATIENT NAME: Fredy Solis    :  1950  MRN: [id number]  Pt Location: [facility] OR ROOM 01    Surgery Date: 2025    Surgeons and Role:     * Carlos Steve MD - Primary    Age-related nuclear cataract, right eye [H25.11]  Posterior subcapsular polar age-related cataract, right eye [H25.041]    Post-Op Diagnosis Codes:     * Age-related nuclear cataract, right eye [H25.11]     * Posterior subcapsular polar age-related cataract, right eye [H25.041]    Procedure(s):  EXTRACTION EXTRACAPSULAR CATARACT PHACO INTRAOCULAR LENS (IOL)    Anesthesia Type:   IV Sedation with Anesthesia    Operative Indications:  Age-related nuclear cataract, right eye [H25.11]  Posterior subcapsular polar age-related cataract, right eye [H25.041]  Decreased vision to 20/60 with glare. With problems driving with glare.  Pt requested cataract sx the right eye    Procedure and Technique:    Procedure Details     The patient was brought in the OR in stable condition and placed on the operative table. The right eye was prepped and draped in the usual sterile manner. Attention was directed to the right eye where a lid speculum was placed. A 2.4 mm clear corneal incision was made temperally. 1/2 cc of 1% MPF Lidocaine was irrigated into the anterior chamber followed by viscoat. The side port incision was placed superiorly. The capsularrhexis was made and the nucleus was hydrodissected with BSS. The nucleus was then removed with the phaco handpiece followed by removal of the cortical material with the I/A handpiece. The capsular bag was then filled with Provisc. The IOL was folded and placed in to the capsular bag and centered well. The remaining Provisc was removed from the eye with the I/A. The wounds were hydrated with BSS and found to be water tight. The lid speculum was removed and 2 drops of Gatifloxicin were placed over the cornea. A protective eye shield was taped over the eye and the patient went to PACU in  stable condition. I will see the patient in the office tomorrow and the expected post op period is a few weeks.       Complications: None        Disposition: PACU   Condition: Stable    SIGNATURE: Carlos Steve MD  DATE: January 27, 2025  TIME: 9:51 AM

## 2025-01-27 NOTE — ANESTHESIA POSTPROCEDURE EVALUATION
Post-Op Assessment Note    CV Status:  Stable  Pain Score: 0    Pain management: adequate       Mental Status:  Alert   Hydration Status:  Stable   PONV Controlled:  None   Airway Patency:  Patent     Post Op Vitals Reviewed: Yes    No anethesia notable event occurred.    Staff: Anesthesiologist, CRNA           Last Filed PACU Vitals:  Vitals Value Taken Time   Temp     Pulse 67    /68    Resp 14    SpO2 98

## 2025-02-25 ENCOUNTER — OFFICE VISIT (OUTPATIENT)
Age: 75
End: 2025-02-25
Payer: MEDICARE

## 2025-02-25 VITALS — HEIGHT: 71 IN | RESPIRATION RATE: 17 BRPM | BODY MASS INDEX: 27.3 KG/M2 | WEIGHT: 195 LBS

## 2025-02-25 DIAGNOSIS — B35.1 ONYCHOMYCOSIS: ICD-10-CM

## 2025-02-25 DIAGNOSIS — R60.9 CHRONIC EDEMA: ICD-10-CM

## 2025-02-25 DIAGNOSIS — L03.031 PARONYCHIA OF TOENAIL OF RIGHT FOOT: ICD-10-CM

## 2025-02-25 DIAGNOSIS — M79.672 PAIN IN BOTH FEET: Primary | ICD-10-CM

## 2025-02-25 DIAGNOSIS — M79.671 PAIN IN BOTH FEET: Primary | ICD-10-CM

## 2025-02-25 DIAGNOSIS — M21.961 ACQUIRED DEFORMITY OF BOTH FEET: ICD-10-CM

## 2025-02-25 DIAGNOSIS — M21.962 ACQUIRED DEFORMITY OF BOTH FEET: ICD-10-CM

## 2025-02-25 PROCEDURE — 99213 OFFICE O/P EST LOW 20 MIN: CPT | Performed by: PODIATRIST

## 2025-02-25 RX ORDER — FLUOXETINE 10 MG/1
10 CAPSULE ORAL DAILY
COMMUNITY

## 2025-02-25 RX ORDER — HYDROXYZINE PAMOATE 25 MG/1
25 CAPSULE ORAL EVERY EVENING
COMMUNITY

## 2025-02-25 RX ORDER — OMEPRAZOLE 20 MG/1
CAPSULE, DELAYED RELEASE ORAL
COMMUNITY
Start: 2025-01-11

## 2025-02-25 NOTE — PROGRESS NOTES
Assessment/Plan: Open wound of toe.  Resolved paronychia.  Acquired deformity foot bilateral.  Mycosis of nail, resolving.  Pain upon ambulation.  Chronic edema     Plan.  Chart reviewed.  PCP notes reviewed.  Lab work reviewed.  Patient advised on condition.  Patient may have had reaction to Lamisil.  He had elevation of inflammatory markers.  At this time we recommend topical antifungal.  Patient advised on nail care.  Patient will watch for signs of infection.  Aftercare instruction given.  Return for follow-up.  In addition patient advised on proper shoe sizing.     In order to help with chronic edema patient will take diuretic as directed by cardiology.  In addition he will consider Job[employer] compression stockings.            Diagnoses and all orders for this visit:     Pain in both feet     Acquired deformity of both feet     Onychomycosis               Subjective: Patient is doing well.  Took medication as prescribed.  He believes it elevated his inflammatory markers     No Known Allergies        Current Outpatient Medications:     terbinafine (LamISIL) 250 mg tablet, 1 tab p.o. every other day., Disp: 15 tablet, Rfl: 0    aspirin 81 MG tablet, Take 81 mg by mouth daily., Disp: , Rfl:     atorvastatin (LIPITOR) 10 mg tablet, Take 10 mg by mouth daily., Disp: , Rfl:     folic acid (FOLVITE) 1 mg tablet, Take 1 mg by mouth daily., Disp: , Rfl:     pantoprazole (PROTONIX) 40 mg tablet, Take 40 mg by mouth daily., Disp: , Rfl:      There is no problem list on file for this patient.            Patient ID: Fredy Solis is a 75 y.o. male.     HPI     The following portions of the patient's history were reviewed and updated as appropriate:      family history is not on file.       reports that he has never smoked. He does not have any smokeless tobacco history on file. He reports that he does not use drugs. No history on file for alcohol use.        Objective:  Patient's shoes and socks removed.   Foot ExamPhysical  Exam       Foot Exam     General  General Appearance: appears stated age and healthy   Orientation: alert and oriented to person, place, and time   Affect: appropriate   Gait: antalgic         Right Foot/Ankle      Inspection and Palpation  Tenderness: metatarsals   Swelling: dorsum   Arch: pes planus  Skin Exam: dry skin;      Neurovascular  Dorsalis pedis: 1+  Posterior tibial: 3+        Left Foot/Ankle       Inspection and Palpation  Tenderness: metatarsals   Swelling: dorsum   Arch: pes planus  Skin Exam: dry skin;      Neurovascular  Dorsalis pedis: 1+  Posterior tibial: 3+           Physical Exam  Vitals and nursing note reviewed.   Constitutional:       Appearance: Normal appearance.   Cardiovascular:      Rate and Rhythm: Normal rate and regular rhythm.      Pulses:           Dorsalis pedis pulses are 1+ on the right side and 1+ on the left side.        Posterior tibial pulses are 3+ on the right side and 3+ on the left side.      1/4 pitting edema bilateral.  Negative Homans' sign.       Feet:      Right foot:      Skin integrity: Dry skin present.      Left foot:      Skin integrity: Dry skin present.   Skin:     Capillary Refill: Capillary refill takes less than 2 seconds.      Comments: Nails are dystrophic.  Nails demonstrate distal mycosis.  Right hallux demonstrates ulcerated fibular nail groove.  Negative pus or infection.  Negative ingrown toenail    Negative pus   Neurological:      Mental Status: He is alert.   Psychiatric:         Mood and Affect: Mood normal.         Behavior: Behavior normal.         Thought Content: Thought content normal.         Judgment: Judgment normal.

## (undated) DEVICE — MICROSURGICAL INSTRUMENT IRR. CYSTITOME 25GA STRAIGHT-REVERSE CUTTING: Brand: ALCON

## (undated) DEVICE — CLEARCUT® SLIT KNIFE INTREPID MICRO-COAXIAL SYSTEM 2.4 SB: Brand: CLEARCUT®; INTREPID

## (undated) DEVICE — GLOVE SRG BIOGEL 7.5

## (undated) DEVICE — CENTURION VISION SYSTEM FMS

## (undated) DEVICE — AIR INJECT CANNULA 27GA: Brand: OPHTHALMIC CANNULA

## (undated) DEVICE — INTREPID® TRANSFORMER IA HP: Brand: INTREPID®

## (undated) DEVICE — ACTIVE FMS W/ INTREPID* ULTRA SLEEVES, 0.9MM 45° ABS* INTREPID* BALANCED TIP: Brand: ALCON

## (undated) DEVICE — THE MONARCH® "D" CARTRIDGE IS A SINGLE-USE POLYPROPYLENE CARTRIDGE FOR POSTERIOR CHAMBER IOL DELIVERY: Brand: MONARCH® III

## (undated) DEVICE — EYE PACK CUSTOM -FINNEGAN

## (undated) DEVICE — B-H IRRIGATING CAN 19GA FLAT ANGLED 8MM: Brand: OPHTHALMIC CANNULA